# Patient Record
Sex: MALE | Race: WHITE | Employment: OTHER | URBAN - METROPOLITAN AREA
[De-identification: names, ages, dates, MRNs, and addresses within clinical notes are randomized per-mention and may not be internally consistent; named-entity substitution may affect disease eponyms.]

---

## 2017-08-03 ENCOUNTER — HOSPITAL ENCOUNTER (OUTPATIENT)
Dept: GASTROENTEROLOGY | Age: 62
Discharge: HOME | End: 2017-09-01
Attending: INTERNAL MEDICINE | Admitting: INTERNAL MEDICINE

## 2017-10-23 PROBLEM — G93.40 ACUTE ENCEPHALOPATHY: Status: ACTIVE | Noted: 2017-10-23

## 2017-10-23 PROBLEM — E87.1 HYPONATREMIA: Status: ACTIVE | Noted: 2017-10-23

## 2017-10-23 PROBLEM — I10 HYPERTENSION: Status: ACTIVE | Noted: 2017-10-23

## 2018-02-12 ENCOUNTER — HOSPITAL ENCOUNTER (OUTPATIENT)
Dept: GENERAL RADIOLOGY | Age: 63
Discharge: HOME | End: 2018-02-12
Attending: FAMILY MEDICINE | Admitting: FAMILY MEDICINE

## 2018-02-12 DIAGNOSIS — R52 PAIN: ICD-10-CM

## 2018-02-21 ENCOUNTER — HOSPITAL ENCOUNTER (OUTPATIENT)
Dept: NEUROLOGY | Age: 63
Discharge: HOME | End: 2018-02-21
Attending: PAIN MEDICINE | Admitting: FAMILY MEDICINE

## 2018-02-21 DIAGNOSIS — G62.9 POLYNEUROPATHY: ICD-10-CM

## 2018-02-22 NOTE — PROCEDURES
[address], [address] [place] [address]                               ELECTROMYOGRAM REPORT    PATIENT NAME: Rhianna Ortez                        :        1955  MED REC NO:   [id number]                          ROOM:  ACCOUNT NO:   [de-identified]                          ADMIT DATE: 2018  PROVIDER:     Sveta Hodgson MD    DATE OF EM2018    REFERRING PROVIDER:  Dr. Cornelia Arias:  1.  Pain in both hands and arms. 2.  Pain in the cervical spine. IMPRESSION:  1. Mild neurogenic EMG changes are seen in the lower cervical paraspinal  muscles in C5-C6 region. This may be suggestive of mild C5-C6  radiculopathy. 2.  Nerve conduction velocities are generally on the lower side of the  normal limit; underlying peripheral neuropathy may need to be considered. 3.  No definite EMG changes are seen in the right and left arm muscles at  this time.         Violet Lee MD    D: 2018 17:25:37       T: 2018 17:28:07     [id number]  Job#: [id number]     Doc#: [id number]    CC:

## 2018-09-27 ENCOUNTER — OFFICE VISIT (OUTPATIENT)
Dept: NEUROSURGERY | Age: 63
End: 2018-09-27
Payer: MEDICARE

## 2018-09-27 VITALS
DIASTOLIC BLOOD PRESSURE: 74 MMHG | SYSTOLIC BLOOD PRESSURE: 118 MMHG | HEIGHT: 66 IN | WEIGHT: 190.8 LBS | HEART RATE: 75 BPM | BODY MASS INDEX: 30.67 KG/M2

## 2018-09-27 DIAGNOSIS — M47.12 CERVICAL SPONDYLOSIS WITH MYELOPATHY: ICD-10-CM

## 2018-09-27 DIAGNOSIS — M47.812 CERVICAL SPONDYLOSIS WITHOUT MYELOPATHY: Primary | ICD-10-CM

## 2018-09-27 PROCEDURE — 99201 PR OFFICE OUTPATIENT NEW 10 MINUTES: CPT | Performed by: NEUROLOGICAL SURGERY

## 2018-10-25 ENCOUNTER — HOSPITAL ENCOUNTER (OUTPATIENT)
Dept: GENERAL RADIOLOGY | Age: 63
Discharge: HOME | End: 2018-10-25
Payer: MEDICARE

## 2018-10-25 DIAGNOSIS — K92.2 ACUTE GASTROINTESTINAL HEMORRHAGE: ICD-10-CM

## 2018-10-25 PROCEDURE — 74245 FL UGI W SMALL BOWEL: CPT

## 2018-10-25 PROCEDURE — 74240 X-RAY XM UPR GI TRC 1CNTRST: CPT

## 2019-03-14 ENCOUNTER — OFFICE VISIT (OUTPATIENT)
Dept: NEUROSURGERY | Age: 64
End: 2019-03-14
Payer: MEDICARE

## 2019-03-14 VITALS
HEART RATE: 99 BPM | WEIGHT: 205.8 LBS | SYSTOLIC BLOOD PRESSURE: 134 MMHG | HEIGHT: 66 IN | DIASTOLIC BLOOD PRESSURE: 76 MMHG | BODY MASS INDEX: 33.07 KG/M2

## 2019-03-14 DIAGNOSIS — M54.16 LUMBAR RADICULOPATHY: ICD-10-CM

## 2019-03-14 DIAGNOSIS — M54.12 CERVICAL RADICULOPATHY: Primary | ICD-10-CM

## 2019-03-14 PROCEDURE — 99213 OFFICE O/P EST LOW 20 MIN: CPT | Performed by: PHYSICIAN ASSISTANT

## 2019-03-14 RX ORDER — BISACODYL 10 MG
10 SUPPOSITORY, RECTAL RECTAL DAILY
COMMUNITY
End: 2020-03-20

## 2019-03-14 RX ORDER — METHADONE HYDROCHLORIDE 5 MG/1
5 TABLET ORAL EVERY 12 HOURS PRN
Status: ON HOLD | COMMUNITY
End: 2021-01-01 | Stop reason: HOSPADM

## 2019-03-14 ASSESSMENT — ENCOUNTER SYMPTOMS
CHEST TIGHTNESS: 0
SHORTNESS OF BREATH: 0
ABDOMINAL PAIN: 0
BACK PAIN: 1
ABDOMINAL DISTENTION: 0
APNEA: 0

## 2019-06-24 ENCOUNTER — HOSPITAL ENCOUNTER (OUTPATIENT)
Age: 64
Setting detail: SPECIMEN
Discharge: HOME | End: 2019-06-24
Payer: MEDICARE

## 2019-06-24 LAB
ALBUMIN SERPL-MCNC: 4.4 GM/DL (ref 3.4–5)
ALP BLD-CCNC: 71 IU/L (ref 40–129)
ALT SERPL-CCNC: 57 U/L (ref 10–40)
ANION GAP SERPL CALCULATED.3IONS-SCNC: 13 MMOL/L (ref 4–16)
AST SERPL-CCNC: 43 IU/L (ref 15–37)
BANDED NEUTROPHILS ABSOLUTE COUNT: 0.03 K/CU MM
BANDED NEUTROPHILS RELATIVE PERCENT: 1 % (ref 5–11)
BILIRUB SERPL-MCNC: 0.3 MG/DL (ref 0–1)
BUN BLDV-MCNC: 15 MG/DL (ref 6–23)
CALCIUM SERPL-MCNC: 9.3 MG/DL (ref 8.3–10.6)
CHLORIDE BLD-SCNC: 93 MMOL/L (ref 99–110)
CO2: 26 MMOL/L (ref 21–32)
CREAT SERPL-MCNC: 0.6 MG/DL (ref 0.9–1.3)
DIFFERENTIAL TYPE: ABNORMAL
ERYTHROCYTE SEDIMENTATION RATE: 18 MM/HR (ref 0–20)
FERRITIN: 27 NG/ML (ref 30–400)
FOLATE: 7.3 NG/ML (ref 3.1–17.5)
GFR AFRICAN AMERICAN: >60 ML/MIN/1.73M2
GFR NON-AFRICAN AMERICAN: >60 ML/MIN/1.73M2
GLUCOSE BLD-MCNC: 228 MG/DL (ref 70–99)
HCT VFR BLD CALC: 39.8 % (ref 42–52)
HEMOGLOBIN: 12.7 GM/DL (ref 13.5–18)
IRON: 58 UG/DL (ref 59–158)
LACTATE DEHYDROGENASE: 188 IU/L (ref 120–246)
LYMPHOCYTES ABSOLUTE: 0.7 K/CU MM
LYMPHOCYTES RELATIVE PERCENT: 22 % (ref 24–44)
MCH RBC QN AUTO: 27.3 PG (ref 27–31)
MCHC RBC AUTO-ENTMCNC: 31.9 % (ref 32–36)
MCV RBC AUTO: 85.6 FL (ref 78–100)
MONOCYTES ABSOLUTE: 0.1 K/CU MM
MONOCYTES RELATIVE PERCENT: 3 % (ref 0–4)
PCT TRANSFERRIN: 11 % (ref 10–44)
PDW BLD-RTO: 14.2 % (ref 11.7–14.9)
PLATELET # BLD: 60 K/CU MM (ref 140–440)
PLT MORPHOLOGY: ABNORMAL
PMV BLD AUTO: 10.7 FL (ref 7.5–11.1)
POTASSIUM SERPL-SCNC: 4.5 MMOL/L (ref 3.5–5.1)
RBC # BLD: 4.65 M/CU MM (ref 4.6–6.2)
RBC # BLD: ABNORMAL 10*6/UL
SEGMENTED NEUTROPHILS ABSOLUTE COUNT: 2.4 K/CU MM
SEGMENTED NEUTROPHILS RELATIVE PERCENT: 74 % (ref 36–66)
SODIUM BLD-SCNC: 132 MMOL/L (ref 135–145)
TOTAL IRON BINDING CAPACITY: 522 UG/DL (ref 250–450)
TOTAL PROTEIN: 7.8 GM/DL (ref 6.4–8.2)
TSH HIGH SENSITIVITY: 1.47 UIU/ML (ref 0.27–4.2)
UNSATURATED IRON BINDING CAPACITY: 464 UG/DL (ref 110–370)
VITAMIN B-12: 985 PG/ML (ref 211–911)
WBC # BLD: 3.2 K/CU MM (ref 4–10.5)

## 2019-06-24 PROCEDURE — 83550 IRON BINDING TEST: CPT

## 2019-06-24 PROCEDURE — 85007 BL SMEAR W/DIFF WBC COUNT: CPT

## 2019-06-24 PROCEDURE — 85652 RBC SED RATE AUTOMATED: CPT

## 2019-06-24 PROCEDURE — 82607 VITAMIN B-12: CPT

## 2019-06-24 PROCEDURE — 83615 LACTATE (LD) (LDH) ENZYME: CPT

## 2019-06-24 PROCEDURE — 83540 ASSAY OF IRON: CPT

## 2019-06-24 PROCEDURE — 86038 ANTINUCLEAR ANTIBODIES: CPT

## 2019-06-24 PROCEDURE — 82728 ASSAY OF FERRITIN: CPT

## 2019-06-24 PROCEDURE — 82746 ASSAY OF FOLIC ACID SERUM: CPT

## 2019-06-24 PROCEDURE — 80053 COMPREHEN METABOLIC PANEL: CPT

## 2019-06-24 PROCEDURE — 88185 FLOWCYTOMETRY/TC ADD-ON: CPT

## 2019-06-24 PROCEDURE — 86430 RHEUMATOID FACTOR TEST QUAL: CPT

## 2019-06-24 PROCEDURE — 88184 FLOWCYTOMETRY/ TC 1 MARKER: CPT

## 2019-06-24 PROCEDURE — 84443 ASSAY THYROID STIM HORMONE: CPT

## 2019-06-24 PROCEDURE — 88182 CELL MARKER STUDY: CPT

## 2019-06-24 PROCEDURE — 85027 COMPLETE CBC AUTOMATED: CPT

## 2019-06-26 LAB
RHEUMATOID FACTOR: 19 IU/ML (ref 0–14)
RHEUMATOID FACTOR: ABNORMAL IU/ML (ref 0–14)

## 2019-06-27 LAB
ANTI-NUCLEAR ANTIBODY (ANA): NORMAL
ANTI-NUCLEAR ANTIBODY (ANA): NORMAL
COMMENT: NORMAL

## 2019-07-12 ENCOUNTER — HOSPITAL ENCOUNTER (OUTPATIENT)
Age: 64
Setting detail: SPECIMEN
Discharge: HOME | End: 2019-07-12
Payer: MEDICARE

## 2019-07-12 LAB
HAV IGM SER IA-ACNC: NON REACTIVE
HEPATITIS B CORE IGM ANTIBODY: NON REACTIVE
HEPATITIS C ANTIBODY: ABNORMAL

## 2019-07-12 PROCEDURE — 86705 HEP B CORE ANTIBODY IGM: CPT

## 2019-07-12 PROCEDURE — 86709 HEPATITIS A IGM ANTIBODY: CPT

## 2019-07-12 PROCEDURE — 86803 HEPATITIS C AB TEST: CPT

## 2019-09-13 ENCOUNTER — HOSPITAL ENCOUNTER (OUTPATIENT)
Age: 64
Setting detail: SPECIMEN
Discharge: HOME | End: 2019-09-13
Payer: MEDICARE

## 2019-09-13 LAB
ALBUMIN SERPL-MCNC: 4.3 GM/DL (ref 3.4–5)
ALP BLD-CCNC: 68 IU/L (ref 40–129)
ALT SERPL-CCNC: 50 U/L (ref 10–40)
ANION GAP SERPL CALCULATED.3IONS-SCNC: 13 MMOL/L (ref 4–16)
AST SERPL-CCNC: 41 IU/L (ref 15–37)
BILIRUB SERPL-MCNC: 0.3 MG/DL (ref 0–1)
BUN BLDV-MCNC: 9 MG/DL (ref 6–23)
CALCIUM SERPL-MCNC: 9 MG/DL (ref 8.3–10.6)
CHLORIDE BLD-SCNC: 94 MMOL/L (ref 99–110)
CO2: 26 MMOL/L (ref 21–32)
CREAT SERPL-MCNC: 0.5 MG/DL (ref 0.9–1.3)
FERRITIN: 50 NG/ML (ref 30–400)
GFR AFRICAN AMERICAN: >60 ML/MIN/1.73M2
GFR NON-AFRICAN AMERICAN: >60 ML/MIN/1.73M2
GLUCOSE BLD-MCNC: 193 MG/DL (ref 70–99)
IRON: 52 UG/DL (ref 59–158)
PCT TRANSFERRIN: 13 % (ref 10–44)
POTASSIUM SERPL-SCNC: 3.9 MMOL/L (ref 3.5–5.1)
SODIUM BLD-SCNC: 133 MMOL/L (ref 135–145)
TOTAL IRON BINDING CAPACITY: 410 UG/DL (ref 250–450)
TOTAL PROTEIN: 7.3 GM/DL (ref 6.4–8.2)
UNSATURATED IRON BINDING CAPACITY: 358 UG/DL (ref 110–370)

## 2019-09-13 PROCEDURE — 82728 ASSAY OF FERRITIN: CPT

## 2019-09-13 PROCEDURE — 80053 COMPREHEN METABOLIC PANEL: CPT

## 2019-09-13 PROCEDURE — 83550 IRON BINDING TEST: CPT

## 2019-09-13 PROCEDURE — 82105 ALPHA-FETOPROTEIN SERUM: CPT

## 2019-09-13 PROCEDURE — 83540 ASSAY OF IRON: CPT

## 2019-09-17 LAB
MS ALPHA-FETOPROTEIN: 4 NG/ML (ref 0–9)
MS ALPHA-FETOPROTEIN: NORMAL NG/ML (ref 0–9)

## 2019-09-24 ENCOUNTER — HOSPITAL ENCOUNTER (OUTPATIENT)
Dept: ULTRASOUND IMAGING | Age: 64
Discharge: HOME | End: 2019-09-24
Payer: MEDICARE

## 2019-09-24 DIAGNOSIS — B18.2 CHRONIC HEPATITIS C WITHOUT HEPATIC COMA (HCC): ICD-10-CM

## 2019-09-24 PROCEDURE — 76705 ECHO EXAM OF ABDOMEN: CPT

## 2019-11-18 ENCOUNTER — HOSPITAL ENCOUNTER (OUTPATIENT)
Dept: INTERVENTIONAL RADIOLOGY/VASCULAR | Age: 64
Discharge: HOME | End: 2019-11-18
Payer: MEDICARE

## 2019-11-18 VITALS
BODY MASS INDEX: 34.53 KG/M2 | OXYGEN SATURATION: 97 % | WEIGHT: 220 LBS | HEART RATE: 94 BPM | HEIGHT: 67 IN | DIASTOLIC BLOOD PRESSURE: 60 MMHG | TEMPERATURE: 97.1 F | SYSTOLIC BLOOD PRESSURE: 123 MMHG | RESPIRATION RATE: 16 BRPM

## 2019-11-18 DIAGNOSIS — B18.2 HEP C W/O COMA, CHRONIC (HCC): ICD-10-CM

## 2019-11-18 LAB
APTT: 27.1 SECONDS (ref 25.1–37.1)
HCT VFR BLD CALC: 41.3 % (ref 42–52)
HEMOGLOBIN: 13.6 GM/DL (ref 13.5–18)
INR BLD: 1.13 INDEX
MCH RBC QN AUTO: 28.8 PG (ref 27–31)
MCHC RBC AUTO-ENTMCNC: 32.9 % (ref 32–36)
MCV RBC AUTO: 87.5 FL (ref 78–100)
PDW BLD-RTO: 14 % (ref 11.7–14.9)
PLATELET # BLD: ABNORMAL K/CU MM (ref 140–440)
PMV BLD AUTO: 10 FL (ref 7.5–11.1)
PROTHROMBIN TIME: 13.1 SECONDS (ref 11.7–14.5)
RBC # BLD: 4.72 M/CU MM (ref 4.6–6.2)
WBC # BLD: 3.9 K/CU MM (ref 4–10.5)

## 2019-11-18 PROCEDURE — 85730 THROMBOPLASTIN TIME PARTIAL: CPT

## 2019-11-18 PROCEDURE — 88313 SPECIAL STAINS GROUP 2: CPT

## 2019-11-18 PROCEDURE — 2580000003 HC RX 258: Performed by: RADIOLOGY

## 2019-11-18 PROCEDURE — 2709999900 HC NON-CHARGEABLE SUPPLY

## 2019-11-18 PROCEDURE — 76942 ECHO GUIDE FOR BIOPSY: CPT

## 2019-11-18 PROCEDURE — 85610 PROTHROMBIN TIME: CPT

## 2019-11-18 PROCEDURE — 7100000010 HC PHASE II RECOVERY - FIRST 15 MIN

## 2019-11-18 PROCEDURE — 85027 COMPLETE CBC AUTOMATED: CPT

## 2019-11-18 PROCEDURE — 88307 TISSUE EXAM BY PATHOLOGIST: CPT

## 2019-11-18 PROCEDURE — 47000 NEEDLE BIOPSY OF LIVER PERQ: CPT

## 2019-11-18 PROCEDURE — 7100000011 HC PHASE II RECOVERY - ADDTL 15 MIN

## 2019-11-18 RX ORDER — MIDAZOLAM HYDROCHLORIDE 1 MG/ML
1 INJECTION INTRAMUSCULAR; INTRAVENOUS ONCE
Status: DISCONTINUED | OUTPATIENT
Start: 2019-11-18 | End: 2019-11-19 | Stop reason: HOSPADM

## 2019-11-18 RX ORDER — FENTANYL CITRATE 50 UG/ML
50 INJECTION, SOLUTION INTRAMUSCULAR; INTRAVENOUS ONCE
Status: DISCONTINUED | OUTPATIENT
Start: 2019-11-18 | End: 2019-11-19 | Stop reason: HOSPADM

## 2019-11-18 RX ORDER — SODIUM CHLORIDE 0.9 % (FLUSH) 0.9 %
10 SYRINGE (ML) INJECTION 2 TIMES DAILY
Status: DISCONTINUED | OUTPATIENT
Start: 2019-11-18 | End: 2019-11-19 | Stop reason: HOSPADM

## 2019-11-18 RX ADMIN — Medication 10 ML: at 08:49

## 2019-11-18 ASSESSMENT — PAIN SCALES - GENERAL
PAINLEVEL_OUTOF10: 0

## 2019-11-18 ASSESSMENT — PAIN - FUNCTIONAL ASSESSMENT: PAIN_FUNCTIONAL_ASSESSMENT: 0-10

## 2020-01-01 ENCOUNTER — HOSPITAL ENCOUNTER (OUTPATIENT)
Dept: PHYSICAL THERAPY | Age: 65
Setting detail: THERAPIES SERIES
Discharge: HOME | End: 2020-11-18
Payer: MEDICARE

## 2020-01-01 ENCOUNTER — HOSPITAL ENCOUNTER (OUTPATIENT)
Dept: GENERAL RADIOLOGY | Age: 65
Discharge: HOME | End: 2020-10-28
Payer: MEDICARE

## 2020-01-01 ENCOUNTER — TELEPHONE (OUTPATIENT)
Dept: FAMILY MEDICINE CLINIC | Age: 65
End: 2020-01-01

## 2020-01-01 ENCOUNTER — HOSPITAL ENCOUNTER (OUTPATIENT)
Dept: PHYSICAL THERAPY | Age: 65
Discharge: HOME | End: 2020-12-03

## 2020-01-01 ENCOUNTER — APPOINTMENT (OUTPATIENT)
Dept: CT IMAGING | Age: 65
End: 2020-01-01
Payer: MEDICARE

## 2020-01-01 ENCOUNTER — OFFICE VISIT (OUTPATIENT)
Dept: ONCOLOGY | Age: 65
End: 2020-01-01
Payer: MEDICARE

## 2020-01-01 ENCOUNTER — OFFICE VISIT (OUTPATIENT)
Dept: FAMILY MEDICINE CLINIC | Age: 65
End: 2020-01-01
Payer: MEDICARE

## 2020-01-01 ENCOUNTER — HOSPITAL ENCOUNTER (OUTPATIENT)
Dept: INFUSION THERAPY | Age: 65
Discharge: HOME | End: 2020-09-10
Payer: MEDICARE

## 2020-01-01 ENCOUNTER — HOSPITAL ENCOUNTER (EMERGENCY)
Age: 65
Discharge: HOME | End: 2020-11-26
Payer: MEDICARE

## 2020-01-01 ENCOUNTER — HOSPITAL ENCOUNTER (OUTPATIENT)
Dept: PHYSICAL THERAPY | Age: 65
Setting detail: THERAPIES SERIES
Discharge: HOME | End: 2020-11-03
Payer: MEDICARE

## 2020-01-01 ENCOUNTER — HOSPITAL ENCOUNTER (EMERGENCY)
Age: 65
Discharge: HOME | End: 2020-11-27
Attending: EMERGENCY MEDICINE
Payer: MEDICARE

## 2020-01-01 ENCOUNTER — HOSPITAL ENCOUNTER (EMERGENCY)
Age: 65
Discharge: ANOTHER HOSPITAL | End: 2020-11-28
Attending: EMERGENCY MEDICINE
Payer: MEDICARE

## 2020-01-01 ENCOUNTER — HOSPITAL ENCOUNTER (OUTPATIENT)
Age: 65
Discharge: HOME | End: 2020-10-28
Payer: MEDICARE

## 2020-01-01 ENCOUNTER — TELEPHONE (OUTPATIENT)
Dept: ONCOLOGY | Age: 65
End: 2020-01-01

## 2020-01-01 ENCOUNTER — HOSPITAL ENCOUNTER (OUTPATIENT)
Age: 65
Discharge: HOME | End: 2020-10-14
Payer: MEDICARE

## 2020-01-01 ENCOUNTER — HOSPITAL ENCOUNTER (OUTPATIENT)
Dept: ULTRASOUND IMAGING | Age: 65
Discharge: HOME | End: 2020-09-08
Payer: MEDICARE

## 2020-01-01 ENCOUNTER — HOSPITAL ENCOUNTER (OUTPATIENT)
Dept: PHYSICAL THERAPY | Age: 65
Discharge: HOME | End: 2020-12-01

## 2020-01-01 ENCOUNTER — HOSPITAL ENCOUNTER (OUTPATIENT)
Dept: INFUSION THERAPY | Age: 65
Discharge: HOME | End: 2020-09-03
Payer: MEDICARE

## 2020-01-01 VITALS
OXYGEN SATURATION: 96 % | WEIGHT: 212 LBS | HEIGHT: 66 IN | HEART RATE: 110 BPM | TEMPERATURE: 97.9 F | SYSTOLIC BLOOD PRESSURE: 176 MMHG | BODY MASS INDEX: 34.07 KG/M2 | DIASTOLIC BLOOD PRESSURE: 94 MMHG | RESPIRATION RATE: 19 BRPM

## 2020-01-01 VITALS
OXYGEN SATURATION: 96 % | HEIGHT: 67 IN | RESPIRATION RATE: 18 BRPM | SYSTOLIC BLOOD PRESSURE: 122 MMHG | DIASTOLIC BLOOD PRESSURE: 78 MMHG | TEMPERATURE: 96.6 F | WEIGHT: 212.2 LBS | HEART RATE: 92 BPM | BODY MASS INDEX: 33.3 KG/M2

## 2020-01-01 VITALS
TEMPERATURE: 97.2 F | BODY MASS INDEX: 33.27 KG/M2 | SYSTOLIC BLOOD PRESSURE: 116 MMHG | WEIGHT: 212 LBS | DIASTOLIC BLOOD PRESSURE: 72 MMHG | HEIGHT: 67 IN | OXYGEN SATURATION: 95 % | HEART RATE: 103 BPM

## 2020-01-01 VITALS
RESPIRATION RATE: 18 BRPM | OXYGEN SATURATION: 100 % | SYSTOLIC BLOOD PRESSURE: 128 MMHG | BODY MASS INDEX: 32.96 KG/M2 | TEMPERATURE: 98.2 F | HEART RATE: 98 BPM | DIASTOLIC BLOOD PRESSURE: 78 MMHG | HEIGHT: 67 IN | WEIGHT: 210 LBS

## 2020-01-01 VITALS
SYSTOLIC BLOOD PRESSURE: 126 MMHG | WEIGHT: 211.8 LBS | BODY MASS INDEX: 33.24 KG/M2 | HEART RATE: 86 BPM | TEMPERATURE: 97.2 F | OXYGEN SATURATION: 97 % | DIASTOLIC BLOOD PRESSURE: 78 MMHG | HEIGHT: 67 IN

## 2020-01-01 VITALS
RESPIRATION RATE: 20 BRPM | BODY MASS INDEX: 34.07 KG/M2 | HEIGHT: 66 IN | WEIGHT: 212 LBS | OXYGEN SATURATION: 98 % | DIASTOLIC BLOOD PRESSURE: 82 MMHG | HEART RATE: 88 BPM | SYSTOLIC BLOOD PRESSURE: 142 MMHG | TEMPERATURE: 98.6 F

## 2020-01-01 DIAGNOSIS — D64.89 OTHER SPECIFIED ANEMIAS: ICD-10-CM

## 2020-01-01 DIAGNOSIS — F19.10 POLYSUBSTANCE ABUSE (HCC): Primary | ICD-10-CM

## 2020-01-01 DIAGNOSIS — D72.819 LEUKOPENIA, UNSPECIFIED TYPE: ICD-10-CM

## 2020-01-01 DIAGNOSIS — K74.60 HEPATIC CIRRHOSIS DUE TO CHRONIC HEPATITIS C INFECTION (HCC): ICD-10-CM

## 2020-01-01 DIAGNOSIS — B18.2 HEPATIC CIRRHOSIS DUE TO CHRONIC HEPATITIS C INFECTION (HCC): ICD-10-CM

## 2020-01-01 DIAGNOSIS — D69.59 OTHER SECONDARY THROMBOCYTOPENIA: ICD-10-CM

## 2020-01-01 LAB
ACETAMINOPHEN LEVEL: <5 UG/ML (ref 15–30)
ACETAMINOPHEN LEVEL: <5 UG/ML (ref 15–30)
ALBUMIN SERPL-MCNC: 4.1 GM/DL (ref 3.4–5)
ALBUMIN SERPL-MCNC: 4.2 GM/DL (ref 3.4–5)
ALBUMIN SERPL-MCNC: 4.5 GM/DL (ref 3.4–5)
ALCOHOL SCREEN SERUM: <0.01 %WT/VOL
ALCOHOL SCREEN SERUM: <0.01 %WT/VOL
ALP BLD-CCNC: 69 IU/L (ref 40–129)
ALP BLD-CCNC: 76 IU/L (ref 40–129)
ALP BLD-CCNC: 77 IU/L (ref 40–129)
ALT SERPL-CCNC: 26 U/L (ref 10–40)
ALT SERPL-CCNC: 30 U/L (ref 10–40)
ALT SERPL-CCNC: 48 U/L (ref 10–40)
ALT SERPL-CCNC: 73 U/L (ref 10–40)
AMPHETAMINES: NEGATIVE
ANION GAP SERPL CALCULATED.3IONS-SCNC: 14 MMOL/L (ref 4–16)
ANION GAP SERPL CALCULATED.3IONS-SCNC: 14 MMOL/L (ref 4–16)
ANION GAP SERPL CALCULATED.3IONS-SCNC: 17 MMOL/L (ref 4–16)
ANTI DNA DOUBLE STRANDED: NORMAL
AST SERPL-CCNC: 109 IU/L (ref 15–37)
AST SERPL-CCNC: 26 IU/L (ref 15–37)
AST SERPL-CCNC: 26 IU/L (ref 15–37)
AST SERPL-CCNC: 76 IU/L (ref 15–37)
BACTERIA: NEGATIVE /HPF
BARBITURATE SCREEN URINE: NEGATIVE
BASOPHILS ABSOLUTE: 0 K/CU MM
BASOPHILS ABSOLUTE: 0 K/CU MM
BASOPHILS ABSOLUTE: 0.1 K/CU MM
BASOPHILS RELATIVE PERCENT: 0.2 % (ref 0–1)
BASOPHILS RELATIVE PERCENT: 0.4 % (ref 0–1)
BASOPHILS RELATIVE PERCENT: 0.5 % (ref 0–1)
BENZODIAZEPINE SCREEN, URINE: NEGATIVE
BILIRUB SERPL-MCNC: 0.8 MG/DL (ref 0–1)
BILIRUB SERPL-MCNC: 0.9 MG/DL (ref 0–1)
BILIRUB SERPL-MCNC: 1.2 MG/DL (ref 0–1)
BILIRUBIN URINE: NEGATIVE MG/DL
BILIRUBIN, POC: ABNORMAL
BLOOD URINE, POC: ABNORMAL
BLOOD, URINE: NEGATIVE
BUN BLDV-MCNC: 10 MG/DL (ref 6–23)
BUN BLDV-MCNC: 11 MG/DL (ref 6–23)
BUN BLDV-MCNC: 13 MG/DL (ref 6–23)
BUN BLDV-MCNC: 20 MG/DL (ref 6–23)
CALCIUM SERPL-MCNC: 8.6 MG/DL (ref 8.3–10.6)
CALCIUM SERPL-MCNC: 8.9 MG/DL (ref 8.3–10.6)
CALCIUM SERPL-MCNC: 9 MG/DL (ref 8.3–10.6)
CANNABINOID SCREEN URINE: ABNORMAL
CHLORIDE BLD-SCNC: 94 MMOL/L (ref 99–110)
CHLORIDE BLD-SCNC: 95 MMOL/L (ref 99–110)
CHLORIDE BLD-SCNC: 96 MMOL/L (ref 99–110)
CLARITY, POC: ABNORMAL
CLARITY: CLEAR
CO2: 22 MMOL/L (ref 21–32)
CO2: 22 MMOL/L (ref 21–32)
CO2: 23 MMOL/L (ref 21–32)
COCAINE METABOLITE: NEGATIVE
COLOR, POC: ABNORMAL
COLOR: YELLOW
COMPLEMENT C3: 110 MG/DL (ref 88–201)
COMPLEMENT C4: 10 MG/DL (ref 10–40)
CREAT SERPL-MCNC: 0.6 MG/DL (ref 0.9–1.3)
CREAT SERPL-MCNC: 0.7 MG/DL (ref 0.9–1.3)
DIFFERENTIAL TYPE: ABNORMAL
DOSE AMOUNT: ABNORMAL
DOSE TIME: ABNORMAL
EOSINOPHILS ABSOLUTE: 0 K/CU MM
EOSINOPHILS ABSOLUTE: 0 K/CU MM
EOSINOPHILS ABSOLUTE: 0.1 K/CU MM
EOSINOPHILS RELATIVE PERCENT: 0 % (ref 0–3)
EOSINOPHILS RELATIVE PERCENT: 0.6 % (ref 0–3)
EOSINOPHILS RELATIVE PERCENT: 0.8 % (ref 0–3)
ERYTHROCYTE SEDIMENTATION RATE: 1 MM/HR (ref 0–20)
ESTIMATED AVERAGE GLUCOSE: 177.2 MG/DL
FERRITIN: 134 NG/ML (ref 30–400)
GFR AFRICAN AMERICAN: >60 ML/MIN/1.73M2
GFR NON-AFRICAN AMERICAN: >60 ML/MIN/1.73M2
GLUCOSE BLD-MCNC: 289 MG/DL (ref 70–99)
GLUCOSE BLD-MCNC: 293 MG/DL (ref 70–99)
GLUCOSE BLD-MCNC: 307 MG/DL (ref 70–99)
GLUCOSE BLD-MCNC: 339 MG/DL (ref 70–99)
GLUCOSE BLD-MCNC: 348 MG/DL (ref 70–99)
GLUCOSE URINE, POC: ABNORMAL
GLUCOSE, URINE: >500 MG/DL
HBA1C MFR BLD: 7.3 %
HBA1C MFR BLD: 7.8 %
HCT VFR BLD CALC: 44.3 % (ref 42–52)
HCT VFR BLD CALC: 46.3 % (ref 42–52)
HCT VFR BLD CALC: 46.8 % (ref 42–52)
HEMOGLOBIN: 14.8 GM/DL (ref 13.5–18)
HEMOGLOBIN: 15.9 GM/DL (ref 13.5–18)
HEMOGLOBIN: 15.9 GM/DL (ref 13.5–18)
HEPATITIS C ANTIBODY INTERPRETATION: REACTIVE
HYALINE CASTS: 0 /LPF
IMMATURE NEUTROPHIL %: 0.4 % (ref 0–0.43)
IMMATURE NEUTROPHIL %: 0.8 % (ref 0–0.43)
IRON: 100 UG/DL (ref 59–158)
KETONES, POC: ABNORMAL
KETONES, URINE: ABNORMAL MG/DL
LEUKOCYTE EST, POC: ABNORMAL
LEUKOCYTE ESTERASE, URINE: NEGATIVE
LYMPHOCYTES ABSOLUTE: 0.8 K/CU MM
LYMPHOCYTES ABSOLUTE: 1.1 K/CU MM
LYMPHOCYTES ABSOLUTE: 1.8 K/CU MM
LYMPHOCYTES RELATIVE PERCENT: 15.5 % (ref 24–44)
LYMPHOCYTES RELATIVE PERCENT: 18.2 % (ref 24–44)
LYMPHOCYTES RELATIVE PERCENT: 7.9 % (ref 24–44)
MCH RBC QN AUTO: 28.8 PG (ref 27–31)
MCH RBC QN AUTO: 29.5 PG (ref 27–31)
MCH RBC QN AUTO: 29.9 PG (ref 27–31)
MCHC RBC AUTO-ENTMCNC: 33.4 % (ref 32–36)
MCHC RBC AUTO-ENTMCNC: 34 % (ref 32–36)
MCHC RBC AUTO-ENTMCNC: 34.3 % (ref 32–36)
MCV RBC AUTO: 85.9 FL (ref 78–100)
MCV RBC AUTO: 86.4 FL (ref 78–100)
MCV RBC AUTO: 88.1 FL (ref 78–100)
MONOCYTES ABSOLUTE: 0.3 K/CU MM
MONOCYTES ABSOLUTE: 0.8 K/CU MM
MONOCYTES ABSOLUTE: 0.8 K/CU MM
MONOCYTES RELATIVE PERCENT: 5.6 % (ref 0–4)
MONOCYTES RELATIVE PERCENT: 6.2 % (ref 0–4)
MONOCYTES RELATIVE PERCENT: 8 % (ref 0–4)
MS ALPHA-FETOPROTEIN: 4 NG/ML (ref 0–9)
MUCUS: ABNORMAL HPF
NITRITE URINE, QUANTITATIVE: NEGATIVE
NITRITE, POC: ABNORMAL
NUCLEATED RBC %: 0 %
NUCLEATED RBC %: 0 %
OPIATES, URINE: NEGATIVE
OXYCODONE: NEGATIVE
PCT TRANSFERRIN: 25 % (ref 10–44)
PDW BLD-RTO: 13.5 % (ref 11.7–14.9)
PDW BLD-RTO: 13.6 % (ref 11.7–14.9)
PDW BLD-RTO: 14.9 % (ref 11.7–14.9)
PH, POC: 5
PH, URINE: 6 (ref 5–8)
PHENCYCLIDINE, URINE: NEGATIVE
PLATELET # BLD: 106 K/CU MM (ref 140–440)
PLATELET # BLD: 127 K/CU MM (ref 140–440)
PLATELET # BLD: 45 K/CU MM (ref 140–440)
PMV BLD AUTO: 10.7 FL (ref 7.5–11.1)
PMV BLD AUTO: 8.9 FL (ref 7.5–11.1)
PMV BLD AUTO: 9.4 FL (ref 7.5–11.1)
POTASSIUM SERPL-SCNC: 3.9 MMOL/L (ref 3.5–5.1)
POTASSIUM SERPL-SCNC: 4.2 MMOL/L (ref 3.5–5.1)
POTASSIUM SERPL-SCNC: 4.3 MMOL/L (ref 3.5–5.1)
PROSTATE SPECIFIC ANTIGEN: 0.22 NG/ML (ref 0–4)
PROTEIN UA: 100 MG/DL
PROTEIN, POC: ABNORMAL
RBC # BLD: 5.13 M/CU MM (ref 4.6–6.2)
RBC # BLD: 5.31 M/CU MM (ref 4.6–6.2)
RBC # BLD: 5.39 M/CU MM (ref 4.6–6.2)
RBC URINE: ABNORMAL /HPF (ref 0–3)
RHEUMATOID FACTOR: 15 IU/ML (ref 0–14)
SALICYLATE LEVEL: 0.2 MG/DL (ref 15–30)
SALICYLATE LEVEL: <0.3 MG/DL (ref 15–30)
SARS-COV-2, NAAT: NOT DETECTED
SEGMENTED NEUTROPHILS ABSOLUTE COUNT: 11.4 K/CU MM
SEGMENTED NEUTROPHILS ABSOLUTE COUNT: 4.1 K/CU MM
SEGMENTED NEUTROPHILS ABSOLUTE COUNT: 7.1 K/CU MM
SEGMENTED NEUTROPHILS RELATIVE PERCENT: 72.3 % (ref 36–66)
SEGMENTED NEUTROPHILS RELATIVE PERCENT: 77.1 % (ref 36–66)
SEGMENTED NEUTROPHILS RELATIVE PERCENT: 85.5 % (ref 36–66)
SODIUM BLD-SCNC: 132 MMOL/L (ref 135–145)
SODIUM BLD-SCNC: 132 MMOL/L (ref 135–145)
SODIUM BLD-SCNC: 133 MMOL/L (ref 135–145)
SOURCE: NORMAL
SPECIFIC GRAVITY UA: 1.03 (ref 1–1.03)
SPECIFIC GRAVITY, POC: 1.02
TOTAL IMMATURE NEUTOROPHIL: 0.04 K/CU MM
TOTAL IMMATURE NEUTOROPHIL: 0.11 K/CU MM
TOTAL IRON BINDING CAPACITY: 404 UG/DL (ref 250–450)
TOTAL NUCLEATED RBC: 0 K/CU MM
TOTAL NUCLEATED RBC: 0 K/CU MM
TOTAL PROTEIN: 7.5 GM/DL (ref 6.4–8.2)
TOTAL PROTEIN: 7.5 GM/DL (ref 6.4–8.2)
TOTAL PROTEIN: 7.8 GM/DL (ref 6.4–8.2)
TRICHOMONAS: ABNORMAL /HPF
UNSATURATED IRON BINDING CAPACITY: 304 UG/DL (ref 110–370)
UROBILINOGEN, POC: ABNORMAL
UROBILINOGEN, URINE: NORMAL MG/DL (ref 0.2–1)
WBC # BLD: 13.3 K/CU MM (ref 4–10.5)
WBC # BLD: 5.3 K/CU MM (ref 4–10.5)
WBC # BLD: 9.8 K/CU MM (ref 4–10.5)
WBC UA: 1 /HPF (ref 0–2)

## 2020-01-01 PROCEDURE — G0480 DRUG TEST DEF 1-7 CLASSES: HCPCS

## 2020-01-01 PROCEDURE — 96372 THER/PROPH/DIAG INJ SC/IM: CPT

## 2020-01-01 PROCEDURE — 3051F HG A1C>EQUAL 7.0%<8.0%: CPT | Performed by: NURSE PRACTITIONER

## 2020-01-01 PROCEDURE — 85652 RBC SED RATE AUTOMATED: CPT

## 2020-01-01 PROCEDURE — 83540 ASSAY OF IRON: CPT

## 2020-01-01 PROCEDURE — 85025 COMPLETE CBC W/AUTO DIFF WBC: CPT

## 2020-01-01 PROCEDURE — 82728 ASSAY OF FERRITIN: CPT

## 2020-01-01 PROCEDURE — 82962 GLUCOSE BLOOD TEST: CPT

## 2020-01-01 PROCEDURE — 6360000002 HC RX W HCPCS: Performed by: EMERGENCY MEDICINE

## 2020-01-01 PROCEDURE — 97535 SELF CARE MNGMENT TRAINING: CPT

## 2020-01-01 PROCEDURE — U0002 COVID-19 LAB TEST NON-CDC: HCPCS

## 2020-01-01 PROCEDURE — 99285 EMERGENCY DEPT VISIT HI MDM: CPT

## 2020-01-01 PROCEDURE — 82565 ASSAY OF CREATININE: CPT

## 2020-01-01 PROCEDURE — 36415 COLL VENOUS BLD VENIPUNCTURE: CPT

## 2020-01-01 PROCEDURE — G8427 DOCREV CUR MEDS BY ELIG CLIN: HCPCS | Performed by: INTERNAL MEDICINE

## 2020-01-01 PROCEDURE — 1123F ACP DISCUSS/DSCN MKR DOCD: CPT | Performed by: NURSE PRACTITIONER

## 2020-01-01 PROCEDURE — 83550 IRON BINDING TEST: CPT

## 2020-01-01 PROCEDURE — 81002 URINALYSIS NONAUTO W/O SCOPE: CPT | Performed by: NURSE PRACTITIONER

## 2020-01-01 PROCEDURE — 72050 X-RAY EXAM NECK SPINE 4/5VWS: CPT

## 2020-01-01 PROCEDURE — 80307 DRUG TEST PRSMV CHEM ANLYZR: CPT

## 2020-01-01 PROCEDURE — 3023F SPIROM DOC REV: CPT | Performed by: NURSE PRACTITIONER

## 2020-01-01 PROCEDURE — 86430 RHEUMATOID FACTOR TEST QUAL: CPT

## 2020-01-01 PROCEDURE — 4040F PNEUMOC VAC/ADMIN/RCVD: CPT | Performed by: NURSE PRACTITIONER

## 2020-01-01 PROCEDURE — G8484 FLU IMMUNIZE NO ADMIN: HCPCS | Performed by: NURSE PRACTITIONER

## 2020-01-01 PROCEDURE — 99214 OFFICE O/P EST MOD 30 MIN: CPT | Performed by: NURSE PRACTITIONER

## 2020-01-01 PROCEDURE — 3017F COLORECTAL CA SCREEN DOC REV: CPT | Performed by: NURSE PRACTITIONER

## 2020-01-01 PROCEDURE — 2022F DILAT RTA XM EVC RTNOPTHY: CPT | Performed by: NURSE PRACTITIONER

## 2020-01-01 PROCEDURE — 72100 X-RAY EXAM L-S SPINE 2/3 VWS: CPT

## 2020-01-01 PROCEDURE — 80053 COMPREHEN METABOLIC PANEL: CPT

## 2020-01-01 PROCEDURE — 99213 OFFICE O/P EST LOW 20 MIN: CPT | Performed by: INTERNAL MEDICINE

## 2020-01-01 PROCEDURE — 82105 ALPHA-FETOPROTEIN SERUM: CPT

## 2020-01-01 PROCEDURE — 99211 OFF/OP EST MAY X REQ PHY/QHP: CPT

## 2020-01-01 PROCEDURE — 84520 ASSAY OF UREA NITROGEN: CPT

## 2020-01-01 PROCEDURE — 4004F PT TOBACCO SCREEN RCVD TLK: CPT | Performed by: INTERNAL MEDICINE

## 2020-01-01 PROCEDURE — G8427 DOCREV CUR MEDS BY ELIG CLIN: HCPCS | Performed by: NURSE PRACTITIONER

## 2020-01-01 PROCEDURE — 99213 OFFICE O/P EST LOW 20 MIN: CPT | Performed by: NURSE PRACTITIONER

## 2020-01-01 PROCEDURE — 72072 X-RAY EXAM THORAC SPINE 3VWS: CPT

## 2020-01-01 PROCEDURE — 3017F COLORECTAL CA SCREEN DOC REV: CPT | Performed by: INTERNAL MEDICINE

## 2020-01-01 PROCEDURE — 97112 NEUROMUSCULAR REEDUCATION: CPT

## 2020-01-01 PROCEDURE — G8417 CALC BMI ABV UP PARAM F/U: HCPCS | Performed by: NURSE PRACTITIONER

## 2020-01-01 PROCEDURE — 6370000000 HC RX 637 (ALT 250 FOR IP): Performed by: EMERGENCY MEDICINE

## 2020-01-01 PROCEDURE — G8926 SPIRO NO PERF OR DOC: HCPCS | Performed by: NURSE PRACTITIONER

## 2020-01-01 PROCEDURE — 86160 COMPLEMENT ANTIGEN: CPT

## 2020-01-01 PROCEDURE — 97161 PT EVAL LOW COMPLEX 20 MIN: CPT

## 2020-01-01 PROCEDURE — 76705 ECHO EXAM OF ABDOMEN: CPT

## 2020-01-01 PROCEDURE — 81001 URINALYSIS AUTO W/SCOPE: CPT

## 2020-01-01 PROCEDURE — 70450 CT HEAD/BRAIN W/O DYE: CPT

## 2020-01-01 PROCEDURE — 1123F ACP DISCUSS/DSCN MKR DOCD: CPT | Performed by: INTERNAL MEDICINE

## 2020-01-01 PROCEDURE — G8417 CALC BMI ABV UP PARAM F/U: HCPCS | Performed by: INTERNAL MEDICINE

## 2020-01-01 PROCEDURE — 4040F PNEUMOC VAC/ADMIN/RCVD: CPT | Performed by: INTERNAL MEDICINE

## 2020-01-01 PROCEDURE — 4004F PT TOBACCO SCREEN RCVD TLK: CPT | Performed by: NURSE PRACTITIONER

## 2020-01-01 PROCEDURE — 99282 EMERGENCY DEPT VISIT SF MDM: CPT

## 2020-01-01 PROCEDURE — 84450 TRANSFERASE (AST) (SGOT): CPT

## 2020-01-01 PROCEDURE — 83036 HEMOGLOBIN GLYCOSYLATED A1C: CPT | Performed by: NURSE PRACTITIONER

## 2020-01-01 PROCEDURE — 36415 COLL VENOUS BLD VENIPUNCTURE: CPT | Performed by: NURSE PRACTITIONER

## 2020-01-01 PROCEDURE — 96374 THER/PROPH/DIAG INJ IV PUSH: CPT

## 2020-01-01 PROCEDURE — 2580000003 HC RX 258: Performed by: EMERGENCY MEDICINE

## 2020-01-01 PROCEDURE — 84460 ALANINE AMINO (ALT) (SGPT): CPT

## 2020-01-01 PROCEDURE — 86225 DNA ANTIBODY NATIVE: CPT

## 2020-01-01 PROCEDURE — 6370000000 HC RX 637 (ALT 250 FOR IP): Performed by: PHYSICIAN ASSISTANT

## 2020-01-01 RX ORDER — PRAZOSIN HYDROCHLORIDE 5 MG/1
10 CAPSULE ORAL NIGHTLY
Qty: 180 CAPSULE | Refills: 0 | Status: SHIPPED | OUTPATIENT
Start: 2020-01-01 | End: 2020-01-01 | Stop reason: SDUPTHER

## 2020-01-01 RX ORDER — PRAZOSIN HYDROCHLORIDE 5 MG/1
10 CAPSULE ORAL NIGHTLY
Qty: 180 CAPSULE | Refills: 0 | Status: SHIPPED | OUTPATIENT
Start: 2020-01-01 | End: 2021-01-01

## 2020-01-01 RX ORDER — CALCITRIOL 0.25 UG/1
0.25 CAPSULE, LIQUID FILLED ORAL DAILY
Status: DISCONTINUED | OUTPATIENT
Start: 2020-01-01 | End: 2020-01-01 | Stop reason: HOSPADM

## 2020-01-01 RX ORDER — FERROUS SULFATE 325(65) MG
325 TABLET ORAL 2 TIMES DAILY WITH MEALS
Status: DISCONTINUED | OUTPATIENT
Start: 2020-01-01 | End: 2020-01-01 | Stop reason: HOSPADM

## 2020-01-01 RX ORDER — M-VIT,TX,IRON,MINS/CALC/FOLIC 27MG-0.4MG
1 TABLET ORAL DAILY
Status: DISCONTINUED | OUTPATIENT
Start: 2020-01-01 | End: 2020-01-01 | Stop reason: HOSPADM

## 2020-01-01 RX ORDER — IBUPROFEN 200 MG
1 TABLET ORAL DAILY
Qty: 200 EACH | Refills: 5 | Status: SHIPPED | OUTPATIENT
Start: 2020-01-01

## 2020-01-01 RX ORDER — IBUPROFEN 600 MG/1
600 TABLET ORAL ONCE
Status: COMPLETED | OUTPATIENT
Start: 2020-01-01 | End: 2020-01-01

## 2020-01-01 RX ORDER — NICOTINE 21 MG/24HR
1 PATCH, TRANSDERMAL 24 HOURS TRANSDERMAL DAILY
Status: DISCONTINUED | OUTPATIENT
Start: 2020-01-01 | End: 2020-01-01

## 2020-01-01 RX ORDER — CALCITRIOL 0.25 UG/1
0.25 CAPSULE, LIQUID FILLED ORAL DAILY
Qty: 90 CAPSULE | Refills: 0 | Status: ON HOLD | OUTPATIENT
Start: 2020-01-01 | End: 2021-01-01 | Stop reason: HOSPADM

## 2020-01-01 RX ORDER — PREGABALIN 25 MG/1
100 CAPSULE ORAL 2 TIMES DAILY
Status: DISCONTINUED | OUTPATIENT
Start: 2020-01-01 | End: 2020-01-01 | Stop reason: HOSPADM

## 2020-01-01 RX ORDER — KETOROLAC TROMETHAMINE 30 MG/ML
15 INJECTION, SOLUTION INTRAMUSCULAR; INTRAVENOUS ONCE
Status: COMPLETED | OUTPATIENT
Start: 2020-01-01 | End: 2020-01-01

## 2020-01-01 RX ORDER — NICOTINE 21 MG/24HR
1 PATCH, TRANSDERMAL 24 HOURS TRANSDERMAL ONCE
Status: DISCONTINUED | OUTPATIENT
Start: 2020-01-01 | End: 2020-01-01 | Stop reason: HOSPADM

## 2020-01-01 RX ORDER — OXYCODONE HYDROCHLORIDE 10 MG/1
TABLET ORAL
Status: ON HOLD | COMMUNITY
Start: 2020-01-01 | End: 2021-01-01 | Stop reason: SDUPTHER

## 2020-01-01 RX ORDER — PHENOL 1.4 %
10 AEROSOL, SPRAY (ML) MUCOUS MEMBRANE NIGHTLY
Qty: 90 TABLET | Refills: 0 | Status: SHIPPED | OUTPATIENT
Start: 2020-01-01 | End: 2020-01-01

## 2020-01-01 RX ORDER — PNV NO.95/FERROUS FUM/FOLIC AC 28MG-0.8MG
TABLET ORAL
Qty: 90 TABLET | Refills: 0 | Status: SHIPPED | OUTPATIENT
Start: 2020-01-01 | End: 2020-01-01 | Stop reason: SDUPTHER

## 2020-01-01 RX ORDER — LISINOPRIL 10 MG/1
5 TABLET ORAL ONCE
Status: DISCONTINUED | OUTPATIENT
Start: 2020-01-01 | End: 2020-01-01 | Stop reason: HOSPADM

## 2020-01-01 RX ORDER — 0.9 % SODIUM CHLORIDE 0.9 %
1000 INTRAVENOUS SOLUTION INTRAVENOUS ONCE
Status: COMPLETED | OUTPATIENT
Start: 2020-01-01 | End: 2020-01-01

## 2020-01-01 RX ORDER — ATORVASTATIN CALCIUM 10 MG/1
10 TABLET, FILM COATED ORAL DAILY
Qty: 90 TABLET | Refills: 0 | Status: SHIPPED | OUTPATIENT
Start: 2020-01-01 | End: 2020-01-01

## 2020-01-01 RX ORDER — ARIPIPRAZOLE 20 MG/1
20 TABLET ORAL DAILY
Qty: 90 TABLET | Refills: 0 | Status: SHIPPED | OUTPATIENT
Start: 2020-01-01 | End: 2020-01-01

## 2020-01-01 RX ORDER — IBUPROFEN 800 MG/1
800 TABLET ORAL ONCE
Status: COMPLETED | OUTPATIENT
Start: 2020-01-01 | End: 2020-01-01

## 2020-01-01 RX ORDER — INSULIN GLARGINE 100 [IU]/ML
85 INJECTION, SOLUTION SUBCUTANEOUS 2 TIMES DAILY
Status: DISCONTINUED | OUTPATIENT
Start: 2020-01-01 | End: 2020-01-01 | Stop reason: HOSPADM

## 2020-01-01 RX ORDER — ATORVASTATIN CALCIUM 10 MG/1
10 TABLET, FILM COATED ORAL NIGHTLY
Status: DISCONTINUED | OUTPATIENT
Start: 2020-01-01 | End: 2020-01-01 | Stop reason: HOSPADM

## 2020-01-01 RX ORDER — PNV NO.95/FERROUS FUM/FOLIC AC 28MG-0.8MG
TABLET ORAL
Qty: 90 TABLET | Refills: 0 | Status: SHIPPED | OUTPATIENT
Start: 2020-01-01 | End: 2021-01-01 | Stop reason: SDUPTHER

## 2020-01-01 RX ORDER — HYDROXYZINE PAMOATE 25 MG/1
25 CAPSULE ORAL ONCE
Status: COMPLETED | OUTPATIENT
Start: 2020-01-01 | End: 2020-01-01

## 2020-01-01 RX ORDER — HYDROXYCHLOROQUINE SULFATE 200 MG/1
TABLET, FILM COATED ORAL
COMMUNITY
Start: 2020-01-01

## 2020-01-01 RX ORDER — TRAZODONE HYDROCHLORIDE 100 MG/1
100 TABLET ORAL NIGHTLY
Qty: 45 TABLET | Refills: 3 | OUTPATIENT
Start: 2020-01-01 | End: 2020-01-01

## 2020-01-01 RX ORDER — ARIPIPRAZOLE 10 MG/1
20 TABLET ORAL DAILY
Status: DISCONTINUED | OUTPATIENT
Start: 2020-01-01 | End: 2020-01-01 | Stop reason: HOSPADM

## 2020-01-01 RX ORDER — LISINOPRIL 5 MG/1
5 TABLET ORAL DAILY
Qty: 90 TABLET | Refills: 0 | Status: SHIPPED | OUTPATIENT
Start: 2020-01-01 | End: 2021-01-01 | Stop reason: DRUGHIGH

## 2020-01-01 RX ORDER — PHENOL 1.4 %
1 AEROSOL, SPRAY (ML) MUCOUS MEMBRANE NIGHTLY
Qty: 90 TABLET | Refills: 0 | Status: SHIPPED | OUTPATIENT
Start: 2020-01-01 | End: 2020-01-01

## 2020-01-01 RX ORDER — BACLOFEN 10 MG/1
TABLET ORAL
COMMUNITY
Start: 2020-01-01 | End: 2020-01-01

## 2020-01-01 RX ORDER — NALOXONE HYDROCHLORIDE 4 MG/.1ML
SPRAY NASAL
Status: ON HOLD | COMMUNITY
Start: 2020-01-01 | End: 2021-01-01 | Stop reason: SDUPTHER

## 2020-01-01 RX ADMIN — IBUPROFEN 800 MG: 800 TABLET, FILM COATED ORAL at 01:14

## 2020-01-01 RX ADMIN — ARIPIPRAZOLE 20 MG: 10 TABLET ORAL at 13:54

## 2020-01-01 RX ADMIN — HYDROXYZINE PAMOATE 25 MG: 25 CAPSULE ORAL at 20:47

## 2020-01-01 RX ADMIN — INSULIN GLARGINE 85 UNITS: 100 INJECTION, SOLUTION SUBCUTANEOUS at 13:56

## 2020-01-01 RX ADMIN — CALCITRIOL CAPSULES 0.25 MCG 0.25 MCG: 0.25 CAPSULE ORAL at 13:54

## 2020-01-01 RX ADMIN — KETOROLAC TROMETHAMINE 15 MG: 30 INJECTION, SOLUTION INTRAMUSCULAR; INTRAVENOUS at 23:34

## 2020-01-01 RX ADMIN — FERROUS SULFATE TAB 325 MG (65 MG ELEMENTAL FE) 325 MG: 325 (65 FE) TAB at 13:54

## 2020-01-01 RX ADMIN — SODIUM CHLORIDE 1000 ML: 9 INJECTION, SOLUTION INTRAVENOUS at 23:34

## 2020-01-01 RX ADMIN — IBUPROFEN 600 MG: 600 TABLET, FILM COATED ORAL at 20:47

## 2020-01-01 RX ADMIN — INSULIN HUMAN 5 UNITS: 100 INJECTION, SOLUTION PARENTERAL at 11:50

## 2020-01-01 ASSESSMENT — PAIN SCALES - GENERAL
PAINLEVEL_OUTOF10: 8
PAINLEVEL_OUTOF10: 4
PAINLEVEL_OUTOF10: 9
PAINLEVEL_OUTOF10: 6
PAINLEVEL_OUTOF10: 9

## 2020-01-01 ASSESSMENT — ENCOUNTER SYMPTOMS
DIARRHEA: 0
SHORTNESS OF BREATH: 0
BACK PAIN: 1
CONSTIPATION: 0
NAUSEA: 0
COUGH: 0
BLOOD IN STOOL: 0
COUGH: 0
NAUSEA: 0
VOMITING: 0
ABDOMINAL PAIN: 0
BACK PAIN: 1
CONSTIPATION: 0
BLOOD IN STOOL: 0
ABDOMINAL PAIN: 0
SHORTNESS OF BREATH: 0
VOMITING: 0

## 2020-01-01 ASSESSMENT — PAIN DESCRIPTION - LOCATION
LOCATION: GENERALIZED
LOCATION: NECK

## 2020-01-01 ASSESSMENT — PAIN DESCRIPTION - FREQUENCY: FREQUENCY: CONTINUOUS

## 2020-01-01 ASSESSMENT — PAIN DESCRIPTION - PAIN TYPE: TYPE: ACUTE PAIN

## 2020-01-01 ASSESSMENT — PAIN DESCRIPTION - ORIENTATION: ORIENTATION: MID

## 2020-01-01 ASSESSMENT — PAIN DESCRIPTION - DESCRIPTORS: DESCRIPTORS: ACHING;SPASM

## 2020-01-14 ENCOUNTER — HOSPITAL ENCOUNTER (OUTPATIENT)
Dept: INFUSION THERAPY | Age: 65
Discharge: HOME | End: 2020-01-14
Payer: MEDICARE

## 2020-01-14 LAB
ALBUMIN SERPL-MCNC: 4.3 GM/DL (ref 3.4–5)
ALP BLD-CCNC: 57 IU/L (ref 40–129)
ALT SERPL-CCNC: 57 U/L (ref 10–40)
ANION GAP SERPL CALCULATED.3IONS-SCNC: 14 MMOL/L (ref 4–16)
AST SERPL-CCNC: 49 IU/L (ref 15–37)
BANDED NEUTROPHILS ABSOLUTE COUNT: 0.22 K/CU MM
BANDED NEUTROPHILS RELATIVE PERCENT: 7 % (ref 5–11)
BILIRUB SERPL-MCNC: 0.4 MG/DL (ref 0–1)
BUN BLDV-MCNC: 11 MG/DL (ref 6–23)
CALCIUM SERPL-MCNC: 8.8 MG/DL (ref 8.3–10.6)
CHLORIDE BLD-SCNC: 92 MMOL/L (ref 99–110)
CO2: 25 MMOL/L (ref 21–32)
CREAT SERPL-MCNC: 0.6 MG/DL (ref 0.9–1.3)
DIFFERENTIAL TYPE: ABNORMAL
DIFFERENTIAL TYPE: ABNORMAL
FERRITIN: 91 NG/ML (ref 30–400)
GFR AFRICAN AMERICAN: >60 ML/MIN/1.73M2
GFR NON-AFRICAN AMERICAN: >60 ML/MIN/1.73M2
GLUCOSE BLD-MCNC: 247 MG/DL (ref 70–99)
HCT VFR BLD CALC: 41.4 % (ref 42–52)
HEMOGLOBIN: 14 GM/DL (ref 13.5–18)
IRON: 83 UG/DL (ref 59–158)
LYMPHOCYTES ABSOLUTE: 0.4 K/CU MM
LYMPHOCYTES RELATIVE PERCENT: 13 % (ref 24–44)
MCH RBC QN AUTO: 29.4 PG (ref 27–31)
MCHC RBC AUTO-ENTMCNC: 33.8 % (ref 32–36)
MCV RBC AUTO: 86.8 FL (ref 78–100)
MONOCYTES ABSOLUTE: 0.3 K/CU MM
MONOCYTES RELATIVE PERCENT: 9 % (ref 0–4)
PCT TRANSFERRIN: 20 % (ref 10–44)
PDW BLD-RTO: 14.7 % (ref 11.7–14.9)
PLATELET # BLD: 39 K/CU MM (ref 140–440)
PMV BLD AUTO: 10.3 FL (ref 7.5–11.1)
POTASSIUM SERPL-SCNC: 3.9 MMOL/L (ref 3.5–5.1)
RBC # BLD: 4.77 M/CU MM (ref 4.6–6.2)
RBC # BLD: ABNORMAL 10*6/UL
SEGMENTED NEUTROPHILS ABSOLUTE COUNT: 2.2 K/CU MM
SEGMENTED NEUTROPHILS RELATIVE PERCENT: 71 % (ref 36–66)
SODIUM BLD-SCNC: 131 MMOL/L (ref 135–145)
TOTAL IRON BINDING CAPACITY: 410 UG/DL (ref 250–450)
TOTAL PROTEIN: 7.6 GM/DL (ref 6.4–8.2)
UNSATURATED IRON BINDING CAPACITY: 327 UG/DL (ref 110–370)
WBC # BLD: 3.1 K/CU MM (ref 4–10.5)

## 2020-01-14 PROCEDURE — 83550 IRON BINDING TEST: CPT

## 2020-01-14 PROCEDURE — 85027 COMPLETE CBC AUTOMATED: CPT

## 2020-01-14 PROCEDURE — 85007 BL SMEAR W/DIFF WBC COUNT: CPT

## 2020-01-14 PROCEDURE — 82728 ASSAY OF FERRITIN: CPT

## 2020-01-14 PROCEDURE — 83540 ASSAY OF IRON: CPT

## 2020-01-14 PROCEDURE — 36415 COLL VENOUS BLD VENIPUNCTURE: CPT

## 2020-01-14 PROCEDURE — 80053 COMPREHEN METABOLIC PANEL: CPT

## 2020-03-19 ENCOUNTER — TELEPHONE (OUTPATIENT)
Dept: FAMILY MEDICINE CLINIC | Age: 65
End: 2020-03-19

## 2020-03-20 ENCOUNTER — OFFICE VISIT (OUTPATIENT)
Dept: FAMILY MEDICINE CLINIC | Age: 65
End: 2020-03-20
Payer: MEDICARE

## 2020-03-20 VITALS
TEMPERATURE: 98 F | DIASTOLIC BLOOD PRESSURE: 60 MMHG | OXYGEN SATURATION: 98 % | HEART RATE: 111 BPM | SYSTOLIC BLOOD PRESSURE: 110 MMHG | WEIGHT: 218 LBS | HEIGHT: 67 IN | BODY MASS INDEX: 34.21 KG/M2

## 2020-03-20 PROBLEM — R21 RASH AND NONSPECIFIC SKIN ERUPTION: Status: ACTIVE | Noted: 2020-03-20

## 2020-03-20 PROBLEM — Z79.4 TYPE 2 DIABETES MELLITUS, WITH LONG-TERM CURRENT USE OF INSULIN (HCC): Status: ACTIVE | Noted: 2020-03-20

## 2020-03-20 PROBLEM — E11.9 TYPE 2 DIABETES MELLITUS, WITH LONG-TERM CURRENT USE OF INSULIN (HCC): Status: ACTIVE | Noted: 2020-03-20

## 2020-03-20 PROBLEM — G89.29 CHRONIC BACK PAIN: Status: ACTIVE | Noted: 2020-03-20

## 2020-03-20 PROBLEM — M54.9 CHRONIC BACK PAIN: Status: ACTIVE | Noted: 2020-03-20

## 2020-03-20 PROCEDURE — 3023F SPIROM DOC REV: CPT | Performed by: NURSE PRACTITIONER

## 2020-03-20 PROCEDURE — G8427 DOCREV CUR MEDS BY ELIG CLIN: HCPCS | Performed by: NURSE PRACTITIONER

## 2020-03-20 PROCEDURE — G8926 SPIRO NO PERF OR DOC: HCPCS | Performed by: NURSE PRACTITIONER

## 2020-03-20 PROCEDURE — G8417 CALC BMI ABV UP PARAM F/U: HCPCS | Performed by: NURSE PRACTITIONER

## 2020-03-20 PROCEDURE — 99205 OFFICE O/P NEW HI 60 MIN: CPT | Performed by: NURSE PRACTITIONER

## 2020-03-20 PROCEDURE — G8484 FLU IMMUNIZE NO ADMIN: HCPCS | Performed by: NURSE PRACTITIONER

## 2020-03-20 PROCEDURE — 3046F HEMOGLOBIN A1C LEVEL >9.0%: CPT | Performed by: NURSE PRACTITIONER

## 2020-03-20 PROCEDURE — 4004F PT TOBACCO SCREEN RCVD TLK: CPT | Performed by: NURSE PRACTITIONER

## 2020-03-20 PROCEDURE — 3017F COLORECTAL CA SCREEN DOC REV: CPT | Performed by: NURSE PRACTITIONER

## 2020-03-20 PROCEDURE — 2022F DILAT RTA XM EVC RTNOPTHY: CPT | Performed by: NURSE PRACTITIONER

## 2020-03-20 RX ORDER — DIAPER,BRIEF,INFANT-TODD,DISP
EACH MISCELLANEOUS
Qty: 1 TUBE | Refills: 1 | Status: CANCELLED | OUTPATIENT
Start: 2020-03-20 | End: 2020-03-27

## 2020-03-20 RX ORDER — ALBUTEROL SULFATE 90 UG/1
2 AEROSOL, METERED RESPIRATORY (INHALATION) EVERY 6 HOURS PRN
COMMUNITY

## 2020-03-20 RX ORDER — LISINOPRIL 5 MG/1
5 TABLET ORAL DAILY
COMMUNITY
Start: 2020-03-18 | End: 2020-04-15 | Stop reason: SDUPTHER

## 2020-03-20 RX ORDER — FERROUS SULFATE 325(65) MG
325 TABLET ORAL DAILY
COMMUNITY
Start: 2015-12-17 | End: 2020-04-15 | Stop reason: SDUPTHER

## 2020-03-20 RX ORDER — IBUPROFEN 600 MG/1
600 TABLET ORAL EVERY 6 HOURS PRN
COMMUNITY
End: 2020-04-15 | Stop reason: SDUPTHER

## 2020-03-20 RX ORDER — ATORVASTATIN CALCIUM 10 MG/1
10 TABLET, FILM COATED ORAL DAILY
COMMUNITY
Start: 2020-03-18 | End: 2020-04-15 | Stop reason: SDUPTHER

## 2020-03-20 RX ORDER — VELPATASVIR AND SOFOSBUVIR 100; 400 MG/1; MG/1
400 TABLET, FILM COATED ORAL DAILY
COMMUNITY
Start: 2020-03-18 | End: 2020-05-20 | Stop reason: ALTCHOICE

## 2020-03-20 RX ORDER — ETODOLAC 500 MG/1
500 TABLET, FILM COATED ORAL 2 TIMES DAILY
COMMUNITY
End: 2020-04-15 | Stop reason: SDUPTHER

## 2020-03-20 RX ORDER — DIAPER,BRIEF,INFANT-TODD,DISP
EACH MISCELLANEOUS
Qty: 1 TUBE | Refills: 1 | Status: SHIPPED | OUTPATIENT
Start: 2020-03-20 | End: 2020-03-27

## 2020-03-20 RX ORDER — PHENOL 1.4 %
AEROSOL, SPRAY (ML) MUCOUS MEMBRANE NIGHTLY
COMMUNITY
End: 2020-04-15 | Stop reason: SDUPTHER

## 2020-03-20 RX ORDER — HYDROXYCHLOROQUINE SULFATE 200 MG/1
TABLET, FILM COATED ORAL 2 TIMES DAILY
COMMUNITY
End: 2020-04-15 | Stop reason: SDUPTHER

## 2020-03-20 ASSESSMENT — ENCOUNTER SYMPTOMS
ABDOMINAL PAIN: 0
SHORTNESS OF BREATH: 0
CONSTIPATION: 0
NAUSEA: 0
VOMITING: 0
DIARRHEA: 0
BACK PAIN: 1
COUGH: 0

## 2020-03-20 NOTE — PROGRESS NOTES
arthritis: taking plaquenil. Chronic intermittent hand rash: likely contact dermatitis, uses hydrocortisone cream and the rash goes away. Is red, not itchy, does not open skin. Hepatitis C with cirrhosis: He is unsure why or how he has this diagnosis. Is follow with Dr. Laura Vargas. Taking Epclusa. Follows with Dr. Louis Israel (unsure why), Dr. Laura aVrgas (hepatits C with cirrhosis), Dr. Char Knowles (pain management). Review of Systems   Constitutional: Negative for activity change, chills, diaphoresis, fatigue, fever and unexpected weight change. Respiratory: Negative for cough and shortness of breath. Cardiovascular: Negative for chest pain and palpitations. Gastrointestinal: Negative for abdominal pain, constipation, diarrhea, nausea and vomiting. Endocrine: Negative for cold intolerance, heat intolerance, polydipsia, polyphagia and polyuria. Genitourinary: Negative for difficulty urinating. Musculoskeletal: Positive for arthralgias, back pain and gait problem. Negative for myalgias. Skin: Negative for rash and wound. Neurological: Negative for weakness and headaches. Psychiatric/Behavioral: Negative for agitation, confusion, dysphoric mood, sleep disturbance and suicidal ideas. The patient is not nervous/anxious. Prior to Visit Medications    Medication Sig Taking?  Authorizing Provider   ibuprofen (ADVIL;MOTRIN) 600 MG tablet Take 600 mg by mouth every 6 hours as needed for Pain Yes Historical Provider, MD   EPCLUSA 400-100 MG TABS Take 400 mg by mouth daily Yes Historical Provider, MD   ferrous sulfate (IRON 325) 325 (65 Fe) MG tablet Take 325 mg by mouth daily Yes Historical Provider, MD   atorvastatin (LIPITOR) 10 MG tablet Take 10 mg by mouth daily Yes Historical Provider, MD   lisinopril (PRINIVIL;ZESTRIL) 5 MG tablet Take 5 mg by mouth daily Yes Historical Provider, MD   Sennosides (SENOKOT PO) Take by mouth nightly Yes Historical Provider, MD   albuterol sulfate HFA (PROAIR HFA) 108 (90 Base) MCG/ACT inhaler Inhale 2 puffs into the lungs every 6 hours as needed for Wheezing Yes Historical Provider, MD   etodolac (LODINE) 500 MG tablet Take 500 mg by mouth 2 times daily Yes Historical Provider, MD   hydroxychloroquine (PLAQUENIL) 200 MG tablet Take by mouth 2 times daily Yes Historical Provider, MD   Melatonin 10 MG TABS Take by mouth nightly Yes Historical Provider, MD   Polyvinyl Alcohol-Povidone (REFRESH OP) Apply to eye Yes Historical Provider, MD   hydrocortisone (ALA-ЕКАТЕРИНА) 1 % cream Apply topically 2 times daily X 2 weeks Yes CAROLANN Sapp NP   insulin aspart (NOVOLOG) 100 UNIT/ML injection vial Inject 30 Units into the skin 3 times daily (before meals) Yes CAROLANN Sapp NP   methadone (DOLOPHINE) 5 MG tablet Take 5 mg by mouth every 12 hours as needed for Pain. Yes Historical Provider, MD   oxyCODONE (ROXICODONE) 5 MG immediate release tablet Take 10 mg by mouth every 6 hours as needed for Pain. . Yes Historical Provider, MD   baclofen (LIORESAL) 20 MG tablet Take 20 mg by mouth 3 times daily Yes Historical Provider, MD   ARIPiprazole (ABILIFY) 20 MG tablet Take 20 mg by mouth daily  Yes Historical Provider, MD   calcitRIOL (ROCALTROL) 0.25 MCG capsule Take 0.25 mcg by mouth daily Indications: supplement Yes Historical Provider, MD   Multiple Vitamin (DAILY WILLEM PO) Take 1 tablet by mouth daily Indications: supplement Yes Historical Provider, MD   insulin glargine (LANTUS) 100 UNIT/ML injection vial Inject 85 Units into the skin 2 times daily Yes Historical Provider, MD   traZODone (DESYREL) 50 MG tablet Take 100 mg by mouth nightly 2 tablets (200mg) at night Yes Historical Provider, MD   prazosin (MINIPRESS) 1 MG capsule Take 10 mg by mouth nightly Yes Historical Provider, MD   pregabalin (LYRICA) 100 MG capsule Take 200 mg by mouth 3 times daily.  Yes Historical Provider, MD        Allergies   Allergen Reactions    Latex     Acetaminophen      endstage liver disease    Bee Venom     Haldol [Haloperidol Lactate]     Nsaids      End stage liver disease    Nuts [Peanut-Containing Drug Products]     Other Swelling     insects       Past Medical History:   Diagnosis Date    Arthritis     [facility] 1 disorder ([facility]     Chronic airway obstruction, not elsewhere classified 10/28/2013    Chronic major depressive disorder, recurrent episode ([facility]     Diabetes mellitus ([facility]     GERD (gastroesophageal reflux disease)     Hepatitis C     Hypertension     Low back pain     Post traumatic stress disorder     Schizo affective schizophrenia ([facility]     per old chart pt in ER 7/28/2017- aggressive behavior at [facility]- and in ER- transferred to [address]) paranoid       History reviewed. No pertinent surgical history.     Social History     Socioeconomic History    Marital status: Unknown     Spouse name: Not on file    Number of children: Not on file    Years of education: Not on file    Highest education level: Not on file   Occupational History    Not on file   Social Needs    Financial resource strain: Not on file    Food insecurity     Worry: Not on file     Inability: Not on file    Transportation needs     Medical: Not on file     Non-medical: Not on file   Tobacco Use    Smoking status: Current Every Day Smoker     Packs/day: 0.50     Types: Cigarettes    Smokeless tobacco: Never Used   Substance and Sexual Activity    Alcohol use: No    Drug use: No    Sexual activity: Never   Lifestyle    Physical activity     Days per week: Not on file     Minutes per session: Not on file    Stress: Not on file   Relationships    Social connections     Talks on phone: Not on file     Gets together: Not on file     Attends [employer] service: Not on file     Active member of club or organization: Not on file     Attends meetings of clubs or organizations: Not on file     Relationship status: Not on file    Intimate partner violence     Fear Mental Status: He is alert and oriented to person, place, and time. Mental status is at baseline. Psychiatric:         Behavior: Behavior normal.         Thought Content: Thought content normal.         ASSESSMENT/PLAN:  1. Essential hypertension  Continue current medication regimen, no changes    2. Hepatic cirrhosis due to chronic hepatitis C infection ([facility]  Continue current medication regimen, no changes  Follow with GI/ [facility]     3. Simple chronic bronchitis (HCC)  Continue current medication regimen, no changes  Albuterol as needed. Follow up if increased need     4. Bipolar affective disorder, current episode mixed, current episode severity unspecified ([facility]  Continue current medication regimen, no changes  Refuses psychiatrist at this time    5. Severe single current episode of major depressive disorder, with psychotic features ([facility]  Continue current medication regimen, no changes    6. Chronic low back pain with sciatica, sciatica laterality unspecified, unspecified back pain laterality  Continue current medication regimen, no changes  Follow with Pain management     7. Type 2 diabetes mellitus with diabetic neuropathy, with long-term current use of insulin (HCC)  Continue current medication regimen, no changes  Accu check four times per day   Novolog refilled     8. Rash and nonspecific skin eruption  Refill hydrocortisone cream   Notify if rash worsens or does not resolve. Only use cream 2 weeks    He will bring all medications to next visit for medication check. Provider notes requested from all other providers seeing the patient in order to get a clear view of the patient needs and history. Return in about 4 weeks (around 4/17/2020) for bipolar, schizo, DM2, med management . An  electronic signature was used to authenticate this note.     --CAROLANN Carrasco NP on 3/20/2020 at 1:45 PM

## 2020-03-23 RX ORDER — IBUPROFEN 200 MG
1 TABLET ORAL DAILY
Qty: 100 EACH | Refills: 3 | Status: CANCELLED | OUTPATIENT
Start: 2020-03-23

## 2020-03-23 RX ORDER — IBUPROFEN 200 MG
1 TABLET ORAL DAILY
Qty: 100 EACH | Refills: 3 | Status: SHIPPED | OUTPATIENT
Start: 2020-03-23 | End: 2020-04-15

## 2020-03-26 ENCOUNTER — NURSE ONLY (OUTPATIENT)
Dept: FAMILY MEDICINE CLINIC | Age: 65
End: 2020-03-26
Payer: MEDICARE

## 2020-03-26 LAB
CHOLESTEROL, TOTAL: 95 MG/DL (ref 0–199)
CREATININE URINE: 123.8 MG/DL (ref 39–259)
HDLC SERPL-MCNC: 40 MG/DL (ref 40–60)
LDL CHOLESTEROL CALCULATED: 38 MG/DL
MICROALBUMIN UR-MCNC: 1.6 MG/DL
MICROALBUMIN/CREAT UR-RTO: 12.9 MG/G (ref 0–30)
TRIGL SERPL-MCNC: 87 MG/DL (ref 0–150)
VLDLC SERPL CALC-MCNC: 17 MG/DL

## 2020-03-26 PROCEDURE — 36415 COLL VENOUS BLD VENIPUNCTURE: CPT | Performed by: NURSE PRACTITIONER

## 2020-03-27 PROBLEM — R74.01 NONSPECIFIC ELEVATION OF LEVELS OF TRANSAMINASE AND LACTIC ACID DEHYDROGENASE (LDH): Status: ACTIVE | Noted: 2020-03-27

## 2020-03-27 PROBLEM — D72.819 DECREASED WHITE BLOOD CELL COUNT: Status: ACTIVE | Noted: 2020-03-27

## 2020-03-27 PROBLEM — R74.02 NONSPECIFIC ELEVATION OF LEVELS OF TRANSAMINASE AND LACTIC ACID DEHYDROGENASE (LDH): Status: ACTIVE | Noted: 2020-03-27

## 2020-03-27 PROBLEM — D69.59 OTHER SECONDARY THROMBOCYTOPENIA: Status: ACTIVE | Noted: 2020-03-27

## 2020-03-27 PROBLEM — D64.89 OTHER SPECIFIED ANEMIAS: Status: ACTIVE | Noted: 2020-03-27

## 2020-03-27 LAB
ESTIMATED AVERAGE GLUCOSE: 174.3 MG/DL
HBA1C MFR BLD: 7.7 %

## 2020-03-27 NOTE — RESULT ENCOUNTER NOTE
Please notify patient of their lab results :    Cholesterol is good. Urine is good. HGBA1C is 7.7  : recommendations are to be below 7, but you have liver disease and are taking multiple other drugs. I think this number is okay. Id like to keep it below 8. Now that youre in your new apartment; you can get outside and exercise. This might help; weight loss. Low carb diet, limit pop and sugar drinks.      -justin PICKARD

## 2020-04-13 RX ORDER — ATORVASTATIN CALCIUM 10 MG/1
10 TABLET, FILM COATED ORAL DAILY
Qty: 90 TABLET | Refills: 1 | OUTPATIENT
Start: 2020-04-13

## 2020-04-13 RX ORDER — ARIPIPRAZOLE 20 MG/1
20 TABLET ORAL DAILY
Qty: 90 TABLET | Refills: 1 | OUTPATIENT
Start: 2020-04-13

## 2020-04-13 RX ORDER — IBUPROFEN 600 MG/1
600 TABLET ORAL EVERY 6 HOURS PRN
Qty: 120 TABLET | OUTPATIENT
Start: 2020-04-13

## 2020-04-13 RX ORDER — INSULIN GLARGINE 100 [IU]/ML
85 INJECTION, SOLUTION SUBCUTANEOUS 2 TIMES DAILY
Qty: 1 VIAL | Refills: 3 | OUTPATIENT
Start: 2020-04-13

## 2020-04-13 RX ORDER — PHENOL 1.4 %
1 AEROSOL, SPRAY (ML) MUCOUS MEMBRANE NIGHTLY
Qty: 90 TABLET | Refills: 1 | OUTPATIENT
Start: 2020-04-13

## 2020-04-13 RX ORDER — FERROUS SULFATE 325(65) MG
325 TABLET ORAL DAILY
Qty: 90 TABLET | Refills: 1 | OUTPATIENT
Start: 2020-04-13

## 2020-04-13 RX ORDER — MULTIVITAMIN
1 TABLET ORAL DAILY
Qty: 90 TABLET | Refills: 1 | OUTPATIENT
Start: 2020-04-13

## 2020-04-13 RX ORDER — HYDROXYCHLOROQUINE SULFATE 200 MG/1
200 TABLET, FILM COATED ORAL 2 TIMES DAILY
Qty: 180 TABLET | Refills: 1 | OUTPATIENT
Start: 2020-04-13

## 2020-04-13 RX ORDER — LISINOPRIL 5 MG/1
5 TABLET ORAL DAILY
Qty: 90 TABLET | Refills: 1 | OUTPATIENT
Start: 2020-04-13

## 2020-04-13 RX ORDER — TRAZODONE HYDROCHLORIDE 100 MG/1
200 TABLET ORAL NIGHTLY
Qty: 180 TABLET | Refills: 1 | OUTPATIENT
Start: 2020-04-13

## 2020-04-13 RX ORDER — ETODOLAC 500 MG/1
500 TABLET, FILM COATED ORAL 2 TIMES DAILY
Qty: 90 TABLET | Refills: 1 | OUTPATIENT
Start: 2020-04-13

## 2020-04-13 RX ORDER — CALCITRIOL 0.25 UG/1
0.25 CAPSULE, LIQUID FILLED ORAL DAILY
Qty: 90 CAPSULE | Refills: 1 | OUTPATIENT
Start: 2020-04-13

## 2020-04-13 RX ORDER — PRAZOSIN HYDROCHLORIDE 5 MG/1
5 CAPSULE ORAL NIGHTLY
Qty: 180 CAPSULE | Refills: 1 | OUTPATIENT
Start: 2020-04-13

## 2020-04-14 PROBLEM — E87.1 HYPONATREMIA: Status: RESOLVED | Noted: 2017-10-23 | Resolved: 2020-04-14

## 2020-04-15 ENCOUNTER — VIRTUAL VISIT (OUTPATIENT)
Dept: FAMILY MEDICINE CLINIC | Age: 65
End: 2020-04-15
Payer: MEDICARE

## 2020-04-15 VITALS — WEIGHT: 218 LBS | BODY MASS INDEX: 34.66 KG/M2

## 2020-04-15 PROCEDURE — 2022F DILAT RTA XM EVC RTNOPTHY: CPT | Performed by: NURSE PRACTITIONER

## 2020-04-15 PROCEDURE — G8427 DOCREV CUR MEDS BY ELIG CLIN: HCPCS | Performed by: NURSE PRACTITIONER

## 2020-04-15 PROCEDURE — 99213 OFFICE O/P EST LOW 20 MIN: CPT | Performed by: NURSE PRACTITIONER

## 2020-04-15 PROCEDURE — 3051F HG A1C>EQUAL 7.0%<8.0%: CPT | Performed by: NURSE PRACTITIONER

## 2020-04-15 PROCEDURE — 3017F COLORECTAL CA SCREEN DOC REV: CPT | Performed by: NURSE PRACTITIONER

## 2020-04-15 RX ORDER — CALCITRIOL 0.25 UG/1
0.25 CAPSULE, LIQUID FILLED ORAL DAILY
Qty: 90 CAPSULE | Refills: 0 | Status: SHIPPED | OUTPATIENT
Start: 2020-04-15 | End: 2020-05-20 | Stop reason: SDUPTHER

## 2020-04-15 RX ORDER — FERROUS SULFATE 325(65) MG
325 TABLET ORAL DAILY
Qty: 90 TABLET | Refills: 0 | Status: SHIPPED | OUTPATIENT
Start: 2020-04-15 | End: 2020-05-20 | Stop reason: SDUPTHER

## 2020-04-15 RX ORDER — INSULIN GLARGINE 100 [IU]/ML
85 INJECTION, SOLUTION SUBCUTANEOUS 2 TIMES DAILY
Qty: 5 VIAL | Refills: 5 | Status: SHIPPED | OUTPATIENT
Start: 2020-04-15 | End: 2020-05-20 | Stop reason: SDUPTHER

## 2020-04-15 RX ORDER — IBUPROFEN 200 MG
1 TABLET ORAL DAILY
Qty: 200 EACH | Refills: 3 | Status: SHIPPED | OUTPATIENT
Start: 2020-04-15 | End: 2020-05-20 | Stop reason: SDUPTHER

## 2020-04-15 RX ORDER — ARIPIPRAZOLE 20 MG/1
20 TABLET ORAL DAILY
Qty: 90 TABLET | Refills: 0 | Status: SHIPPED | OUTPATIENT
Start: 2020-04-15 | End: 2020-05-20 | Stop reason: SDUPTHER

## 2020-04-15 RX ORDER — IBUPROFEN 600 MG/1
600 TABLET ORAL EVERY 6 HOURS PRN
Qty: 30 TABLET | Refills: 0 | Status: SHIPPED | OUTPATIENT
Start: 2020-04-15 | End: 2020-01-01

## 2020-04-15 RX ORDER — ATORVASTATIN CALCIUM 10 MG/1
10 TABLET, FILM COATED ORAL DAILY
Qty: 90 TABLET | Refills: 0 | Status: SHIPPED | OUTPATIENT
Start: 2020-04-15 | End: 2020-05-20 | Stop reason: SDUPTHER

## 2020-04-15 RX ORDER — BLOOD PRESSURE TEST KIT
1 KIT MISCELLANEOUS DAILY
Qty: 1 KIT | Refills: 0 | Status: SHIPPED | OUTPATIENT
Start: 2020-04-15

## 2020-04-15 RX ORDER — ETODOLAC 500 MG/1
500 TABLET, FILM COATED ORAL 2 TIMES DAILY
Qty: 60 TABLET | Refills: 0 | Status: ON HOLD | OUTPATIENT
Start: 2020-04-15 | End: 2021-01-01 | Stop reason: HOSPADM

## 2020-04-15 RX ORDER — PRAZOSIN HYDROCHLORIDE 5 MG/1
10 CAPSULE ORAL NIGHTLY
Qty: 180 CAPSULE | Refills: 0 | Status: SHIPPED | OUTPATIENT
Start: 2020-04-15 | End: 2020-05-20 | Stop reason: SDUPTHER

## 2020-04-15 RX ORDER — HYDROXYCHLOROQUINE SULFATE 200 MG/1
200 TABLET, FILM COATED ORAL 2 TIMES DAILY
Qty: 120 TABLET | Refills: 0 | Status: SHIPPED | OUTPATIENT
Start: 2020-04-15 | End: 2020-06-14

## 2020-04-15 RX ORDER — TRAZODONE HYDROCHLORIDE 100 MG/1
100 TABLET ORAL NIGHTLY
Qty: 45 TABLET | Refills: 0 | Status: SHIPPED | OUTPATIENT
Start: 2020-04-15 | End: 2020-01-01 | Stop reason: ALTCHOICE

## 2020-04-15 RX ORDER — MULTIVITAMIN
1 TABLET ORAL DAILY
Qty: 90 TABLET | Refills: 0 | Status: SHIPPED | OUTPATIENT
Start: 2020-04-15 | End: 2020-01-01

## 2020-04-15 RX ORDER — PHENOL 1.4 %
10 AEROSOL, SPRAY (ML) MUCOUS MEMBRANE NIGHTLY
Qty: 90 TABLET | Refills: 0 | Status: SHIPPED | OUTPATIENT
Start: 2020-04-15 | End: 2020-01-01 | Stop reason: SDUPTHER

## 2020-04-15 RX ORDER — LISINOPRIL 5 MG/1
5 TABLET ORAL DAILY
Qty: 90 TABLET | Refills: 0 | Status: SHIPPED | OUTPATIENT
Start: 2020-04-15 | End: 2020-05-20 | Stop reason: SDUPTHER

## 2020-05-06 ENCOUNTER — HOSPITAL ENCOUNTER (OUTPATIENT)
Dept: INFUSION THERAPY | Age: 65
Discharge: HOME | End: 2020-05-06
Payer: MEDICARE

## 2020-05-06 LAB
ALBUMIN SERPL-MCNC: 3.9 GM/DL (ref 3.4–5)
ALP BLD-CCNC: 59 IU/L (ref 40–129)
ALT SERPL-CCNC: 25 U/L (ref 10–40)
ANION GAP SERPL CALCULATED.3IONS-SCNC: 10 MMOL/L (ref 4–16)
AST SERPL-CCNC: 27 IU/L (ref 15–37)
BASOPHILS ABSOLUTE: 0 K/CU MM
BASOPHILS RELATIVE PERCENT: 0.3 % (ref 0–1)
BILIRUB SERPL-MCNC: 0.5 MG/DL (ref 0–1)
BUN BLDV-MCNC: 14 MG/DL (ref 6–23)
CALCIUM SERPL-MCNC: 8.8 MG/DL (ref 8.3–10.6)
CHLORIDE BLD-SCNC: 97 MMOL/L (ref 99–110)
CO2: 27 MMOL/L (ref 21–32)
CREAT SERPL-MCNC: 0.7 MG/DL (ref 0.9–1.3)
DIFFERENTIAL TYPE: ABNORMAL
EOSINOPHILS ABSOLUTE: 0 K/CU MM
EOSINOPHILS RELATIVE PERCENT: 0.9 % (ref 0–3)
FERRITIN: 85 NG/ML (ref 30–400)
GFR AFRICAN AMERICAN: >60 ML/MIN/1.73M2
GFR NON-AFRICAN AMERICAN: >60 ML/MIN/1.73M2
GLUCOSE BLD-MCNC: 331 MG/DL (ref 70–99)
HCT VFR BLD CALC: 42.6 % (ref 42–52)
HEMOGLOBIN: 14.3 GM/DL (ref 13.5–18)
IRON: 66 UG/DL (ref 59–158)
LYMPHOCYTES ABSOLUTE: 0.5 K/CU MM
LYMPHOCYTES RELATIVE PERCENT: 14 % (ref 24–44)
MCH RBC QN AUTO: 30.2 PG (ref 27–31)
MCHC RBC AUTO-ENTMCNC: 33.6 % (ref 32–36)
MCV RBC AUTO: 90.1 FL (ref 78–100)
MONOCYTES ABSOLUTE: 0.2 K/CU MM
MONOCYTES RELATIVE PERCENT: 5.6 % (ref 0–4)
PCT TRANSFERRIN: 16 % (ref 10–44)
PDW BLD-RTO: 14.3 % (ref 11.7–14.9)
PLATELET # BLD: 37 K/CU MM (ref 140–440)
PMV BLD AUTO: 10.7 FL (ref 7.5–11.1)
POTASSIUM SERPL-SCNC: 4.4 MMOL/L (ref 3.5–5.1)
RBC # BLD: 4.73 M/CU MM (ref 4.6–6.2)
SEGMENTED NEUTROPHILS ABSOLUTE COUNT: 2.5 K/CU MM
SEGMENTED NEUTROPHILS RELATIVE PERCENT: 79.2 % (ref 36–66)
SODIUM BLD-SCNC: 134 MMOL/L (ref 135–145)
TOTAL IRON BINDING CAPACITY: 406 UG/DL (ref 250–450)
TOTAL PROTEIN: 6.8 GM/DL (ref 6.4–8.2)
UNSATURATED IRON BINDING CAPACITY: 340 UG/DL (ref 110–370)
WBC # BLD: 3.2 K/CU MM (ref 4–10.5)

## 2020-05-06 PROCEDURE — 83540 ASSAY OF IRON: CPT

## 2020-05-06 PROCEDURE — 99211 OFF/OP EST MAY X REQ PHY/QHP: CPT

## 2020-05-06 PROCEDURE — 82105 ALPHA-FETOPROTEIN SERUM: CPT

## 2020-05-06 PROCEDURE — 82728 ASSAY OF FERRITIN: CPT

## 2020-05-06 PROCEDURE — 80053 COMPREHEN METABOLIC PANEL: CPT

## 2020-05-06 PROCEDURE — 85025 COMPLETE CBC W/AUTO DIFF WBC: CPT

## 2020-05-06 PROCEDURE — 83550 IRON BINDING TEST: CPT

## 2020-05-06 PROCEDURE — 36415 COLL VENOUS BLD VENIPUNCTURE: CPT

## 2020-05-07 LAB — MS ALPHA-FETOPROTEIN: 4 NG/ML (ref 0–9)

## 2020-05-20 ENCOUNTER — TELEPHONE (OUTPATIENT)
Dept: FAMILY MEDICINE CLINIC | Age: 65
End: 2020-05-20

## 2020-05-20 ENCOUNTER — OFFICE VISIT (OUTPATIENT)
Dept: FAMILY MEDICINE CLINIC | Age: 65
End: 2020-05-20
Payer: MEDICARE

## 2020-05-20 VITALS
OXYGEN SATURATION: 93 % | HEIGHT: 67 IN | WEIGHT: 213.6 LBS | SYSTOLIC BLOOD PRESSURE: 124 MMHG | DIASTOLIC BLOOD PRESSURE: 68 MMHG | TEMPERATURE: 97.6 F | BODY MASS INDEX: 33.53 KG/M2 | HEART RATE: 90 BPM

## 2020-05-20 VITALS
HEIGHT: 67 IN | HEART RATE: 90 BPM | SYSTOLIC BLOOD PRESSURE: 124 MMHG | BODY MASS INDEX: 33.53 KG/M2 | TEMPERATURE: 97.6 F | DIASTOLIC BLOOD PRESSURE: 68 MMHG | OXYGEN SATURATION: 93 % | WEIGHT: 213.6 LBS

## 2020-05-20 PROCEDURE — 3017F COLORECTAL CA SCREEN DOC REV: CPT | Performed by: NURSE PRACTITIONER

## 2020-05-20 PROCEDURE — G8417 CALC BMI ABV UP PARAM F/U: HCPCS | Performed by: NURSE PRACTITIONER

## 2020-05-20 PROCEDURE — G0438 PPPS, INITIAL VISIT: HCPCS | Performed by: NURSE PRACTITIONER

## 2020-05-20 PROCEDURE — 99213 OFFICE O/P EST LOW 20 MIN: CPT | Performed by: NURSE PRACTITIONER

## 2020-05-20 PROCEDURE — 3051F HG A1C>EQUAL 7.0%<8.0%: CPT | Performed by: NURSE PRACTITIONER

## 2020-05-20 PROCEDURE — 2022F DILAT RTA XM EVC RTNOPTHY: CPT | Performed by: NURSE PRACTITIONER

## 2020-05-20 PROCEDURE — G8427 DOCREV CUR MEDS BY ELIG CLIN: HCPCS | Performed by: NURSE PRACTITIONER

## 2020-05-20 PROCEDURE — 4004F PT TOBACCO SCREEN RCVD TLK: CPT | Performed by: NURSE PRACTITIONER

## 2020-05-20 PROCEDURE — 99406 BEHAV CHNG SMOKING 3-10 MIN: CPT | Performed by: NURSE PRACTITIONER

## 2020-05-20 RX ORDER — ARIPIPRAZOLE 20 MG/1
20 TABLET ORAL DAILY
Qty: 90 TABLET | Refills: 0 | Status: SHIPPED | OUTPATIENT
Start: 2020-05-20 | End: 2020-01-01 | Stop reason: SDUPTHER

## 2020-05-20 RX ORDER — IBUPROFEN 200 MG
1 TABLET ORAL DAILY
Qty: 200 EACH | Refills: 3 | Status: SHIPPED | OUTPATIENT
Start: 2020-05-20 | End: 2020-01-01 | Stop reason: SDUPTHER

## 2020-05-20 RX ORDER — ATORVASTATIN CALCIUM 10 MG/1
10 TABLET, FILM COATED ORAL DAILY
Qty: 90 TABLET | Refills: 0 | Status: SHIPPED | OUTPATIENT
Start: 2020-05-20 | End: 2020-01-01 | Stop reason: SDUPTHER

## 2020-05-20 RX ORDER — FERROUS SULFATE 325(65) MG
325 TABLET ORAL DAILY
Qty: 90 TABLET | Refills: 0 | Status: SHIPPED | OUTPATIENT
Start: 2020-05-20 | End: 2020-01-01

## 2020-05-20 RX ORDER — INSULIN GLARGINE 100 [IU]/ML
85 INJECTION, SOLUTION SUBCUTANEOUS 2 TIMES DAILY
Qty: 5 VIAL | Refills: 5 | Status: ON HOLD | OUTPATIENT
Start: 2020-05-20 | End: 2021-01-01 | Stop reason: SDUPTHER

## 2020-05-20 RX ORDER — PRAZOSIN HYDROCHLORIDE 5 MG/1
10 CAPSULE ORAL NIGHTLY
Qty: 180 CAPSULE | Refills: 0 | Status: SHIPPED | OUTPATIENT
Start: 2020-05-20 | End: 2020-01-01 | Stop reason: SDUPTHER

## 2020-05-20 RX ORDER — SENNA PLUS 8.6 MG/1
1 TABLET ORAL NIGHTLY
Qty: 90 TABLET | Refills: 0 | Status: SHIPPED | OUTPATIENT
Start: 2020-05-20 | End: 2020-01-01

## 2020-05-20 RX ORDER — LISINOPRIL 5 MG/1
5 TABLET ORAL DAILY
Qty: 90 TABLET | Refills: 0 | Status: SHIPPED | OUTPATIENT
Start: 2020-05-20 | End: 2020-01-01 | Stop reason: SDUPTHER

## 2020-05-20 RX ORDER — CALCITRIOL 0.25 UG/1
0.25 CAPSULE, LIQUID FILLED ORAL DAILY
Qty: 90 CAPSULE | Refills: 0 | Status: SHIPPED | OUTPATIENT
Start: 2020-05-20 | End: 2020-01-01 | Stop reason: SDUPTHER

## 2020-05-20 ASSESSMENT — ENCOUNTER SYMPTOMS
NAUSEA: 0
COUGH: 0
BACK PAIN: 1
VOMITING: 0
DIARRHEA: 0
CONSTIPATION: 0
BLOOD IN STOOL: 0
ABDOMINAL PAIN: 0
SHORTNESS OF BREATH: 0

## 2020-05-20 ASSESSMENT — LIFESTYLE VARIABLES: HOW OFTEN DO YOU HAVE A DRINK CONTAINING ALCOHOL: 0

## 2020-05-20 ASSESSMENT — PATIENT HEALTH QUESTIONNAIRE - PHQ9
SUM OF ALL RESPONSES TO PHQ QUESTIONS 1-9: 2
SUM OF ALL RESPONSES TO PHQ QUESTIONS 1-9: 2

## 2020-05-20 NOTE — PROGRESS NOTES
MG TABS Take 10 tablets by mouth nightly Yes Roselyn Davila APRN - NP   hydroxychloroquine (PLAQUENIL) 200 MG tablet Take 1 tablet by mouth 2 times daily Yes Roselyn Davila APRN - NP   Blood Pressure KIT 1 kit by Does not apply route daily Yes Roselyn Davila APRN - NP   albuterol sulfate HFA (PROAIR HFA) 108 (90 Base) MCG/ACT inhaler Inhale 2 puffs into the lungs every 6 hours as needed for Wheezing Yes Historical Provider, MD   Polyvinyl Alcohol-Povidone (REFRESH OP) Apply to eye Yes Historical Provider, MD   methadone (DOLOPHINE) 5 MG tablet Take 5 mg by mouth every 12 hours as needed for Pain. Yes Historical Provider, MD   oxyCODONE (ROXICODONE) 5 MG immediate release tablet Take 10 mg by mouth every 6 hours as needed for Pain. . Yes Historical Provider, MD   baclofen (LIORESAL) 20 MG tablet Take 20 mg by mouth 3 times daily Yes Historical Provider, MD   pregabalin (LYRICA) 100 MG capsule Take 100 mg by mouth 3 times daily. Yes Historical Provider, MD   ibuprofen (ADVIL;MOTRIN) 600 MG tablet Take 1 tablet by mouth every 6 hours as needed for Pain  Roselyn Davila APRN Barbara NP   etodolac (LODINE) 500 MG tablet Take 1 tablet by mouth 2 times daily  CAROLANN Muller NP        Social History     Tobacco Use    Smoking status: Current Every Day Smoker     Packs/day: 0.50     Types: Cigarettes    Smokeless tobacco: Never Used   Substance Use Topics    Alcohol use: No        Vitals:    05/20/20 0914   BP: 124/68   Site: Left Upper Arm   Position: Sitting   Pulse: 90   Temp: 97.6 °F (36.4 °C)   SpO2: 93%   Weight: 213 lb 9.6 oz (96.9 kg)   Height: 5' 6.5\" (1.689 m)     Estimated body mass index is 33.96 kg/m² as calculated from the following:    Height as of this encounter: 5' 6.5\" (1.689 m). Weight as of this encounter: 213 lb 9.6 oz (96.9 kg). Physical Exam  Vitals signs reviewed. Constitutional:       Appearance: Normal appearance. He is obese.    HENT:      Head: Normocephalic and atraumatic. Nose: Nose normal.   Eyes:      Extraocular Movements: Extraocular movements intact. Pupils: Pupils are equal, round, and reactive to light. Neck:      Musculoskeletal: Normal range of motion. Cardiovascular:      Rate and Rhythm: Normal rate and regular rhythm. Heart sounds: Normal heart sounds. Pulmonary:      Effort: Pulmonary effort is normal.      Breath sounds: Normal breath sounds. Abdominal:      General: Bowel sounds are normal. There is no distension. Palpations: Abdomen is soft. There is no mass. Tenderness: There is no abdominal tenderness. Hernia: No hernia is present. Musculoskeletal: Normal range of motion. Skin:     General: Skin is warm and dry. Findings: No petechiae, rash or wound. Comments: Red patchy eczema on face    Neurological:      General: No focal deficit present. Mental Status: He is alert and oriented to person, place, and time. Mental status is at baseline. Psychiatric:         Mood and Affect: Mood normal.         Behavior: Behavior normal.         Thought Content: Thought content normal.         Judgment: Judgment normal.         ASSESSMENT/PLAN:  1. Bipolar affective disorder, current episode mixed, current episode severity unspecified ([facility])  - ARIPiprazole (ABILIFY) 20 MG tablet; Take 1 tablet by mouth daily  Dispense: 90 tablet; Refill: 0    2. Severe single current episode of major depressive disorder, with psychotic features ([address]  - ARIPiprazole (ABILIFY) 20 MG tablet; Take 1 tablet by mouth daily  Dispense: 90 tablet; Refill: 0    3. Type 2 diabetes mellitus with diabetic neuropathy, with long-term current use of insulin ([facility])  - Amb External Referral To Podiatry  - atorvastatin (LIPITOR) 10 MG tablet; Take 1 tablet by mouth daily  Dispense: 90 tablet;  Refill: 0  - insulin aspart (NOVOLOG) 100 UNIT/ML injection vial; Inject 30 Units into the skin 3 times daily (before meals)  Dispense: 3 vial; Refill: 5  - insulin glargine (LANTUS) 100 UNIT/ML injection vial; Inject 85 Units into the skin 2 times daily  Dispense: 5 vial; Refill: 5  - INSULIN SYRINGE 1CC/29G ([employer] INS SYRINGE 1CC/29G) 29G X 1/2\" 1 ML MISC; 1 each by Does not apply route daily  Dispense: 200 each; Refill: 3    4. Other secondary thrombocytopenia  - ferrous sulfate (IRON 325) 325 (65 Fe) MG tablet; Take 1 tablet by mouth daily  Dispense: 90 tablet; Refill: 0    5. Essential hypertension  - lisinopril (PRINIVIL;ZESTRIL) 5 MG tablet; Take 1 tablet by mouth daily  Dispense: 90 tablet; Refill: 0  - prazosin (MINIPRESS) 5 MG capsule; Take 2 capsules by mouth nightly  Dispense: 180 capsule; Refill: 0    6. Psychophysiological insomnia         PLAN:  Refill medications as requested. And insulin syringes. 2 boxes   Smoking cessation  Exercise and weight loss  Check hgba1c in July 2020. May be able to decrease amount of insulin he is taking. Return for end of july for fasting labs and check up . An electronic signature was used to authenticate this note.     --CAROLANN Inman NP on 5/20/2020 at 11:02 PM

## 2020-05-21 NOTE — PATIENT INSTRUCTIONS
your doctor before you try any diet. These conditions include kidney disease, heart disease, type 2 diabetes, high cholesterol, and high blood pressure. If you are pregnant, it may not be safe for your baby if you are on a low-carb diet. How can you lose weight safely? You might have heard that a diet plan helped another person lose weight. But that doesn't mean that it will work for you. It is very hard to stay on a diet that includes lots of big changes in your eating habits. If you want to get to a healthy weight and stay there, making healthy lifestyle changes will often work better than dieting. These steps can help. · Make a plan for change. Work with your doctor to create a plan that is right for you. · See a dietitian. He or she can show you how to make healthy changes in your eating habits. · Manage stress. If you have a lot of stress in your life, it can be hard to focus on making healthy changes to your daily habits. · Track your food and activity. You are likely to do better at losing weight if you keep track of what you eat and what you do. Follow-up care is a key part of your treatment and safety. Be sure to make and go to all appointments, and call your doctor if you are having problems. It's also a good idea to know your test results and keep a list of the medicines you take. Where can you learn more? Go to https://[employer]. org and sign in to your [employer] account. Enter A121 in the [facility] box to learn more about \"Learning About Low-Carbohydrate Diets for Weight Loss. \"     If you do not have an account, please click on the \"Sign Up Now\" link. Current as of: August 21, 2019Content Version: 12.4  © 6495-4783 Healthwise, Incorporated. Care instructions adapted under license by [facility] ([facility]).  If you have questions about a medical condition or this instruction, always ask your healthcare professional. Velvet Mccormick disclaims any warranty or liability for your use of this information. Personalized Preventive Plan for Adry Koenig - 5/20/2020  Medicare offers a range of preventive health benefits. Some of the tests and screenings are paid in full while other may be subject to a deductible, co-insurance, and/or copay. Some of these benefits include a comprehensive review of your medical history including lifestyle, illnesses that may run in your family, and various assessments and screenings as appropriate. After reviewing your medical record and screening and assessments performed today your provider may have ordered immunizations, labs, imaging, and/or referrals for you. A list of these orders (if applicable) as well as your Preventive Care list are included within your After Visit Summary for your review. Other Preventive Recommendations:    · A preventive eye exam performed by an eye specialist is recommended every 1-2 years to screen for glaucoma; cataracts, macular degeneration, and other eye disorders. · A preventive dental visit is recommended every 6 months. · Try to get at least 150 minutes of exercise per week or 10,000 steps per day on a pedometer . · Order or download the FREE \"Exercise & Physical Activity: Your Everyday Guide\" from [employer]. Call 1-942.636.6154 or search [employer] online. · You need 2200-2918 mg of calcium and 2728-8315 IU of vitamin D per day. It is possible to meet your calcium requirement with diet alone, but a vitamin D supplement is usually necessary to meet this goal.  · When exposed to the sun, use a sunscreen that protects against both UVA and UVB radiation with an SPF of 30 or greater. Reapply every 2 to 3 hours or after sweating, drying off with a towel, or swimming. · Always wear a seat belt when traveling in a car. Always wear a helmet when riding a bicycle or motorcycle.

## 2020-05-21 NOTE — PROGRESS NOTES
Medicare Annual Wellness Visit  Name: Chio Zavala Date: 2020   MRN: [id number] Sex: Male   Age: 59 y.o. Ethnicity: Non-/Non    : 1955 Race: Beau Koenig is here for Medicare AWV    Screenings for behavioral, psychosocial and functional/safety risks, and cognitive dysfunction are all negative except as indicated below. These results, as well as other patient data from the [address] form, are documented in Flowsheets linked to this Encounter. Allergies   Allergen Reactions    Latex     Acetaminophen      endstage liver disease    Bee Venom     Haldol [Haloperidol Lactate]     Nsaids      End stage liver disease    Nuts [Peanut-Containing Drug Products]     Other Swelling     insects         Prior to Visit Medications    Medication Sig Taking? Authorizing Provider   traZODone (DESYREL) 100 MG tablet Take 1 tablet by mouth nightly Yes CAROLANN Berry NP   Multiple Vitamin (DAILY WILLEM) TABS Take 1 tablet by mouth daily Indications: supplement Yes CAROLANN Berry NP   Melatonin 10 MG TABS Take 10 tablets by mouth nightly Yes CAROLANN Berry NP   hydroxychloroquine (PLAQUENIL) 200 MG tablet Take 1 tablet by mouth 2 times daily Yes CAROLANN Berry NP   Blood Pressure KIT 1 kit by Does not apply route daily Yes CAROLANN Berry NP   albuterol sulfate HFA (PROAIR HFA) 108 (90 Base) MCG/ACT inhaler Inhale 2 puffs into the lungs every 6 hours as needed for Wheezing Yes Historical Provider, MD   Polyvinyl Alcohol-Povidone (REFRESH OP) Apply to eye Yes Historical Provider, MD   methadone (DOLOPHINE) 5 MG tablet Take 5 mg by mouth every 12 hours as needed for Pain. Yes Historical Provider, MD   oxyCODONE (ROXICODONE) 5 MG immediate release tablet Take 10 mg by mouth every 6 hours as needed for Pain. . Yes Historical Provider, MD   baclofen (LIORESAL) 20 MG tablet Take 20 mg by mouth 3 times daily Yes Historical Provider, MD   pregabalin (LYRICA) 100 MG capsule Take 100 mg by mouth 3 times daily.  Yes Historical Provider, MD   ARIPiprazole (ABILIFY) 20 MG tablet Take 1 tablet by mouth daily  CAROLANN Liu NP   atorvastatin (LIPITOR) 10 MG tablet Take 1 tablet by mouth daily  CAROLANN Liu NP   calcitRIOL (ROCALTROL) 0.25 MCG capsule Take 1 capsule by mouth daily Indications: supplement  CAROLANN Grace NP   ferrous sulfate (IRON 325) 325 (65 Fe) MG tablet Take 1 tablet by mouth daily  CAROLANN Liu NP   insulin aspart (NOVOLOG) 100 UNIT/ML injection vial Inject 30 Units into the skin 3 times daily (before meals)  CAROLANN Liu NP   insulin glargine (LANTUS) 100 UNIT/ML injection vial Inject 85 Units into the skin 2 times daily  CAROLANN Liu NP   INSULIN SYRINGE 1CC/29G ([employer] INS SYRINGE 1CC/29G) 29G X 1/2\" 1 ML MISC 1 each by Does not apply route daily  CAROLANN Liu NP   lisinopril (PRINIVIL;ZESTRIL) 5 MG tablet Take 1 tablet by mouth daily  CAROLANN Liu NP   prazosin (MINIPRESS) 5 MG capsule Take 2 capsules by mouth nightly  CAROLANN Liu NP   senna (SENOKOT) 8.6 MG tablet Take 1 tablet by mouth nightly  CAROLANN Liu NP   ibuprofen (ADVIL;MOTRIN) 600 MG tablet Take 1 tablet by mouth every 6 hours as needed for Pain  CAROLANN Liu NP   etodolac (LODINE) 500 MG tablet Take 1 tablet by mouth 2 times daily  CAROLANN Liu NP         Past Medical History:   Diagnosis Date    Arthritis     Bipolar 1 disorder ([address]     Chronic airway obstruction, not elsewhere classified 10/28/2013    Chronic major depressive disorder, recurrent episode ([address]     Diabetes mellitus ([address]     GERD (gastroesophageal reflux disease)     Hepatitis C     Hypertension     Low back pain     Post traumatic stress disorder     Schizo affective schizophrenia ([address]     per old chart pt in ER 7/28/2017- mg/dl  · Maintain LDL cholesterol levels under 3.0 mmol/l or 115 mg/dl   · Control blood glucose levels  · Consider taking aspirin (75 mg daily), once blood pressure is controlled   Provided a follow up plan.   Time spent (minutes): 10 minutes        CAROLANN Walsh - NP

## 2020-06-03 RX ORDER — ETODOLAC 500 MG/1
500 TABLET, FILM COATED ORAL 2 TIMES DAILY
Qty: 60 TABLET | Refills: 0 | OUTPATIENT
Start: 2020-06-03 | End: 2020-01-01

## 2020-06-19 ENCOUNTER — HOSPITAL ENCOUNTER (OUTPATIENT)
Age: 65
Discharge: HOME | End: 2020-06-19
Payer: MEDICARE

## 2020-06-19 LAB
ALT SERPL-CCNC: 23 U/L (ref 10–40)
AST SERPL-CCNC: 29 IU/L (ref 15–37)
BUN BLDV-MCNC: 14 MG/DL (ref 6–23)
CREAT SERPL-MCNC: 0.7 MG/DL (ref 0.9–1.3)
EKG ATRIAL RATE: 89 BPM
EKG DIAGNOSIS: NORMAL
EKG P AXIS: 61 DEGREES
EKG P-R INTERVAL: 176 MS
EKG Q-T INTERVAL: 358 MS
EKG QRS DURATION: 88 MS
EKG QTC CALCULATION (BAZETT): 435 MS
EKG R AXIS: 46 DEGREES
EKG T AXIS: 39 DEGREES
EKG VENTRICULAR RATE: 89 BPM
ERYTHROCYTE SEDIMENTATION RATE: 7 MM/HR (ref 0–20)
GFR AFRICAN AMERICAN: >60 ML/MIN/1.73M2
GFR NON-AFRICAN AMERICAN: >60 ML/MIN/1.73M2

## 2020-06-19 PROCEDURE — 82565 ASSAY OF CREATININE: CPT

## 2020-06-19 PROCEDURE — 84520 ASSAY OF UREA NITROGEN: CPT

## 2020-06-19 PROCEDURE — 36415 COLL VENOUS BLD VENIPUNCTURE: CPT

## 2020-06-19 PROCEDURE — 85652 RBC SED RATE AUTOMATED: CPT

## 2020-06-19 PROCEDURE — 84460 ALANINE AMINO (ALT) (SGPT): CPT

## 2020-06-19 PROCEDURE — 93005 ELECTROCARDIOGRAM TRACING: CPT | Performed by: PHYSICIAN ASSISTANT

## 2020-06-19 PROCEDURE — 86160 COMPLEMENT ANTIGEN: CPT

## 2020-06-19 PROCEDURE — 84450 TRANSFERASE (AST) (SGOT): CPT

## 2020-06-19 PROCEDURE — 93010 ELECTROCARDIOGRAM REPORT: CPT | Performed by: INTERNAL MEDICINE

## 2020-06-21 LAB
COMPLEMENT C3: 96 MG/DL (ref 88–201)
COMPLEMENT C4: 8 MG/DL (ref 10–40)

## 2020-07-24 NOTE — PROGRESS NOTES
2020     Oren Koenig (:  1955) is a 59 y.o. male, here for evaluation of the following medical concerns:      Presents for follow up on chronic problems and med refills. Left the  nursing home in 2020. Is living in a senior apartment on his own.      Following with pain management/ elaina for lyrica, baclofen, oxycodone,  Methadone. Has been requesting me to fill his trazadone, I will not. Takes melatonin 10mg dose for insomnia as well.      Following with Dr. Ramón Rowe hematology for \"blood disorder\". \"for something he has not classified yet\"  Has petechia and low platelets. Dr. Ayanna Dillon keeps up with his labs.      Needs to follow up with GI/ Dr. Janet Villaseñor; just finished epclusa and is wanting a recheck on hepatitis C. Has an appointment with him Tuesday. Follows with rheumatology for rheum arthritis as he is on plaquenil and two different NSAIDS. Ibuprofen and Lodine. Saw Jose Saleem, keeps wanting me to refill his plaquenil, lodine.      States he has been eating less carbs since and seeing a change in his glucose. Glucose is running 150-160  30 units of novolog with each meal TID. 5 units at HS  80 units lantus BID  Checks glucose before each meal and at HS     Cut down smoking to 10 cigarettes per day, was attempting to quit.    is now smoking 1 PPD. Does not want to quit. Is bored with COVID lockdown     \"feels run down, tired generally fatigued chronically\"     urinary frequency: since he was a kid. Always wetting the bed since a child, still bed wetting. Micky Alvarado a very small bladder because it folded over\". Has seen urology decades ago in [place]. (( has wet his pants today in office. .. )))         Review of Systems   Constitutional: Negative for activity change, appetite change, chills, diaphoresis, fatigue and fever. Eyes: Negative for visual disturbance. Respiratory: Negative for cough and shortness of breath. Cardiovascular: Negative for chest pain and palpitations. Gastrointestinal: Negative for abdominal pain, blood in stool, constipation, diarrhea, nausea and vomiting. Genitourinary: Positive for enuresis and frequency. Negative for decreased urine volume, difficulty urinating, dysuria, hematuria and urgency. Musculoskeletal: Positive for arthralgias and back pain. Negative for gait problem. Skin: Negative for rash and wound. Neurological: Negative for dizziness, weakness, light-headedness and headaches. Hematological: Does not bruise/bleed easily. Psychiatric/Behavioral: Positive for sleep disturbance. Negative for dysphoric mood and suicidal ideas. The patient is not nervous/anxious. Prior to Visit Medications    Medication Sig Taking?  Authorizing Provider   ARIPiprazole (ABILIFY) 20 MG tablet Take 1 tablet by mouth daily Yes CAROLANN Ackerman NP   atorvastatin (LIPITOR) 10 MG tablet Take 1 tablet by mouth daily Yes CAROLANN Ackerman NP   calcitRIOL (ROCALTROL) 0.25 MCG capsule Take 1 capsule by mouth daily Indications: supplement Yes CAROLANN Ackerman NP   ferrous sulfate (IRON 325) 325 (65 Fe) MG tablet Take 1 tablet by mouth daily Yes CAROLANN Ackerman NP   insulin aspart (NOVOLOG) 100 UNIT/ML injection vial Inject 30 Units into the skin 3 times daily (before meals) Yes CAROLANN Ackerman NP   insulin glargine (LANTUS) 100 UNIT/ML injection vial Inject 85 Units into the skin 2 times daily Yes CAROLANN Ackerman NP   INSULIN SYRINGE 1CC/29G ([employer] INS SYRINGE 1CC/29G) 29G X 1/2\" 1 ML MISC 1 each by Does not apply route daily Yes CAROLANN Ackerman NP   lisinopril (PRINIVIL;ZESTRIL) 5 MG tablet Take 1 tablet by mouth daily Yes CAROLANN Ackerman NP   prazosin (MINIPRESS) 5 MG capsule Take 2 capsules by mouth nightly Yes CAROLANN Ackerman NP   senna (SENOKOT) 8.6 MG tablet Take 1 tablet by mouth nightly Yes CAROLANN Ackerman NP   Blood Pressure KIT 1 kit by Does not apply route daily Yes CAROLANN Wilson NP   albuterol sulfate HFA (PROAIR HFA) 108 (90 Base) MCG/ACT inhaler Inhale 2 puffs into the lungs every 6 hours as needed for Wheezing Yes Historical Provider, MD   Polyvinyl Alcohol-Povidone (REFRESH OP) Apply to eye Yes Historical Provider, MD   methadone (DOLOPHINE) 5 MG tablet Take 5 mg by mouth every 12 hours as needed for Pain. Yes Historical Provider, MD   oxyCODONE (ROXICODONE) 5 MG immediate release tablet Take 10 mg by mouth every 6 hours as needed for Pain. . Yes Historical Provider, MD   baclofen (LIORESAL) 20 MG tablet Take 20 mg by mouth 3 times daily Yes Historical Provider, MD   pregabalin (LYRICA) 100 MG capsule Take 100 mg by mouth 3 times daily. Yes Historical Provider, MD   traZODone (DESYREL) 100 MG tablet Take 1 tablet by mouth nightly  CAROLANN Wilson NP   Multiple Vitamin (DAILY WILLEM) TABS Take 1 tablet by mouth daily Indications: supplement  CAROLANN Grace NP   Melatonin 10 MG TABS Take 10 tablets by mouth nightly  CAROLANN Wilson NP   ibuprofen (ADVIL;MOTRIN) 600 MG tablet Take 1 tablet by mouth every 6 hours as needed for Pain  CAROLANN Wilson NP   etodolac (LODINE) 500 MG tablet Take 1 tablet by mouth 2 times daily  CAROLANN Wilson NP        Social History     Tobacco Use    Smoking status: Current Every Day Smoker     Packs/day: 1.00     Types: Cigarettes    Smokeless tobacco: Never Used   Substance Use Topics    Alcohol use: No        Vitals:    07/24/20 0926   BP: 116/72   Site: Left Upper Arm   Position: Sitting   Cuff Size: Medium Adult   Pulse: 103   Temp: 97.2 °F (36.2 °C)   SpO2: 95%   Weight: 212 lb (96.2 kg)   Height: 5' 6.5\" (1.689 m)     Estimated body mass index is 33.71 kg/m² as calculated from the following:    Height as of this encounter: 5' 6.5\" (1.689 m). Weight as of this encounter: 212 lb (96.2 kg). Physical Exam  Vitals signs reviewed.    Constitutional: 90 tablet; Refill: 0  - INSULIN SYRINGE 1CC/29G ([employer] INS SYRINGE 1CC/29G) 29G X 1/2\" 1 ML MISC; 1 each by Does not apply route daily  Dispense: 200 each; Refill: 5    3. Bipolar affective disorder, current episode mixed, current episode severity unspecified (HCC)  - ARIPiprazole (ABILIFY) 20 MG tablet; Take 1 tablet by mouth daily  Dispense: 90 tablet; Refill: 0    4. Severe single current episode of major depressive disorder, with psychotic features ([facility] [address]  - ARIPiprazole (ABILIFY) 20 MG tablet; Take 1 tablet by mouth daily  Dispense: 90 tablet; Refill: 0    5. Psychophysiological insomnia  Wanting trazadone. I will not fill. He is on too many sedatives and narcotics. - Melatonin 10 MG TABS; Take 10 tablets by mouth nightly  Dispense: 90 tablet; Refill: 0    6. Urinary frequency  Chronic problem. - POCT Urinalysis no Micro  - Psa screening  - AFL - Veda Brown MD, Urology, [facility]    7. Nocturnal enuresis  Chronic problem. - POCT Urinalysis no Micro  - AFL - Veda Brown MD, Urology, [facility]    8. Hepatic cirrhosis due to chronic hepatitis C infection ([facility]  - HEPATITIS C ANTIBODY    9. Encounter for screening for malignant neoplasm of prostate   - Psa screening    Present to the ER for any emergent or acute symptoms not managed at home or in office. Follow up with Dr. Valentina Bailey, urology[facility]      Return in about 3 months (around 10/24/2020) for HTN DM cholesterol check. An electronic signature was used to authenticate this note.     --CAROLANN Jaramillo NP on 7/24/2020 at 10:07 AM

## 2020-07-27 NOTE — RESULT ENCOUNTER NOTE
Hepatitis C antibody is reactive. Meaning he has hepatitis C. We already knew this.   Hemoglobin A1c is steadily creeping up at 7.8  Prostate screening normal

## 2020-08-10 NOTE — TELEPHONE ENCOUNTER
Patient called to get the results of labs, gave him message that they have been mailed as well due to not being able to contact him. He gave verbal understanding of results.

## 2020-08-27 NOTE — TELEPHONE ENCOUNTER
Rosanne stewart called and insulin needles were written for 1 a day and pt gives himself 5 inj in a day.  Verbal given to rosanne stewart per justin for needles to be 5 times a day
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
## 2020-09-10 NOTE — PROGRESS NOTES
MA Rooming Questions  Patient: Kuldeep Schmidt  MRN: [id number]    Date: 9/10/2020        1. Do you have any new issues?   no         2. Do you need any refills on medications?    no    3. Have you had any imaging done since your last visit? yes - U/S    4. Have you been hospitalized or seen in the emergency room since your last visit here?   no    5. Did the patient have a depression screening completed today?  No    No data recorded     PHQ-9 Given to (if applicable):               PHQ-9 Score (if applicable):                     [] Positive     []  Negative              Does question #9 need addressed (if applicable)                     [] Yes    []  No               Valerie Betancur MA

## 2020-10-30 PROBLEM — G93.40 ACUTE ENCEPHALOPATHY: Status: RESOLVED | Noted: 2017-10-23 | Resolved: 2020-01-01

## 2020-10-30 NOTE — TELEPHONE ENCOUNTER
Received a VM from a Amanda requesting a call for [facility] and other needs for patient. Attempted to call her back had to leave message .   Amanda: [phone]

## 2020-10-30 NOTE — PATIENT INSTRUCTIONS
Address: [address] [facility], [facility], [address] [facility]   Phone: [phone]  Dr. Ravindra Street - gastroenterology.

## 2020-10-30 NOTE — PROGRESS NOTES
10/30/2020     Joaquin Koenig (:  1955) is a 72 y.o. male, here for evaluation of the following medical concerns:          Presents for follow up on chronic problems and med refills. Left the  nursing home in 2020. Is living in a senior apartment on his own.      Following with pain management/ elaina for lyrica, baclofen, oxycodone. No longer taking methadone because he needed to cut back on narcotics. Has been requesting me to fill his trazadone, I will not. Takes melatonin 10mg dose for insomnia as well.      Following with Dr. Mariah Bach hematology for \"blood disorder\". \"for something he has not classified yet\"  Has petechia and low platelets. Dr. Lisa Valderrama keeps up with his labs.      Needs to follow up with GI/ Dr. Ramírez Needs; just finished epclusa and is wanting a recheck on hepatitis C. States he gags and \"retches\" often. One episode of emesis yesterday      Follows with rheumatology for rheum arthritis as he is on plaquenil and two different NSAIDS. Ibuprofen and Lodine. Saw Gabriela Royal, keeps wanting me to refill his plaquenil, lodine. Says his medications have been changed, but he is not sure to what.      Type 2 diabetes. Not eating a specific diet. Glucose is running 150-175  35 units of novolog with each meal TID. 5 units at HS  85 units lantus BID  Checks glucose before each meal and at HS  Does not want to see an endocrinologist- changes his own insulin because \"I have known my diabetes long enough, so I know what to do\"      smoking 1 PPD. Does not want to quit. Is bored with COVID lockdown    Three falls in the last month- denies injuries. \"catch myself on hands and knees and go down gently\"   Was trying to get dressed, fell when putting pants on. Walking to a store- fell outside. \"just lost balance\"  Is wanting an electric wheelchair. Ambulates with cane and walker currently. Uses the electric wheelchair at [facility]. States he has never hit his head.     Is working on getting a home health aid through in insurance. Does not want to move out of his apartment. Review of Systems   Constitutional: Negative for activity change, appetite change, chills, diaphoresis, fatigue, fever and unexpected weight change. Eyes: Negative for visual disturbance. Respiratory: Negative for cough and shortness of breath. Cardiovascular: Negative for chest pain, palpitations and leg swelling. Gastrointestinal: Negative for abdominal pain, blood in stool, constipation, nausea and vomiting. Genitourinary: Negative for difficulty urinating. Urinary incontinence   Musculoskeletal: Positive for arthralgias, back pain and gait problem. Skin: Negative. Neurological: Positive for light-headedness (contributes to falls. ). Negative for dizziness, weakness, numbness and headaches. Hematological: Bruises/bleeds easily. Psychiatric/Behavioral: Positive for dysphoric mood and sleep disturbance. Negative for suicidal ideas. The patient is nervous/anxious. Prior to Visit Medications    Medication Sig Taking?  Authorizing Provider   Ferrous Sulfate (IRON) 325 (65 Fe) MG TABS TAKE ONE (1) TABLET BY MOUTH DAILY Yes CAROLANN Bruce NP   prazosin (MINIPRESS) 5 MG capsule Take 2 capsules by mouth nightly Yes CAROLANN Bruce NP   hydroxychloroquine (PLAQUENIL) 200 MG tablet  Yes Historical Provider, MD   oxyCODONE HCl (OXY-IR) 10 MG immediate release tablet  Yes Historical Provider, MD   NARCAN 4 MG/0.1ML LIQD nasal spray  Yes Historical Provider, MD   ARIPiprazole (ABILIFY) 20 MG tablet Take 1 tablet by mouth daily Yes CAROLANN Bruce NP   atorvastatin (LIPITOR) 10 MG tablet Take 1 tablet by mouth daily Yes CAROLANN Bruce NP   calcitRIOL (ROCALTROL) 0.25 MCG capsule Take 1 capsule by mouth daily Indications: supplement Yes CAROLANN Bruce NP   INSULIN SYRINGE 1CC/29G (Pia Coronater INS SYRINGE 1CC/29G) 29G X 1/2\" 1 ML MISC 1 each by Does not apply route daily Yes CAROLANN Haney NP   lisinopril (PRINIVIL;ZESTRIL) 5 MG tablet Take 1 tablet by mouth daily Yes CAROLANN Haney NP   Melatonin 10 MG TABS Take 1 tablet by mouth nightly Yes CAROLANN Haney NP   insulin aspart (NOVOLOG) 100 UNIT/ML injection vial Inject 30 Units into the skin 3 times daily (before meals) Yes CAROLANN Haney NP   insulin glargine (LANTUS) 100 UNIT/ML injection vial Inject 85 Units into the skin 2 times daily Yes CAROLANN Haney NP   Multiple Vitamin (DAILY WILLEM) TABS Take 1 tablet by mouth daily Indications: supplement Yes CAROLANN Haney NP   etodolac (LODINE) 500 MG tablet Take 1 tablet by mouth 2 times daily Yes CAROLANN Haney NP   Blood Pressure KIT 1 kit by Does not apply route daily Yes CAROLANN Haney NP   albuterol sulfate HFA (PROAIR HFA) 108 (90 Base) MCG/ACT inhaler Inhale 2 puffs into the lungs every 6 hours as needed for Wheezing Yes Historical Provider, MD   Polyvinyl Alcohol-Povidone (REFRESH OP) Apply to eye Yes Historical Provider, MD   methadone (DOLOPHINE) 5 MG tablet Take 5 mg by mouth every 12 hours as needed for Pain. Yes Historical Provider, MD   baclofen (LIORESAL) 20 MG tablet Take 20 mg by mouth 3 times daily Yes Historical Provider, MD   pregabalin (LYRICA) 100 MG capsule Take 100 mg by mouth 3 times daily.  Yes Historical Provider, MD   ibuprofen (ADVIL;MOTRIN) 600 MG tablet Take 1 tablet by mouth every 6 hours as needed for Pain  CAROLANN Haney NP        Social History     Tobacco Use    Smoking status: Current Every Day Smoker     Packs/day: 1.00     Types: Cigarettes    Smokeless tobacco: Never Used   Substance Use Topics    Alcohol use: No        Vitals:    10/30/20 0923   BP: 126/78   Site: Left Upper Arm   Position: Sitting   Cuff Size: Large Adult   Pulse: 86   Temp: 97.2 °F (36.2 °C)   SpO2: 97%   Weight: 211 lb 12.8 oz (96.1 kg)   Height: 5' 6.5\" (1.689 m) Estimated body mass index is 33.67 kg/m² as calculated from the following:    Height as of this encounter: 5' 6.5\" (1.689 m). Weight as of this encounter: 211 lb 12.8 oz (96.1 kg). Physical Exam  Vitals signs reviewed. Constitutional:       General: He is not in acute distress. Appearance: Normal appearance. He is obese. He is not ill-appearing, toxic-appearing or diaphoretic. Comments: Wearing a hat, mask, sunglasses. Refuses to take his glasses off. HENT:      Head: Normocephalic and atraumatic. Nose: Nose normal.   Eyes:      Extraocular Movements: Extraocular movements intact. Pupils: Pupils are equal, round, and reactive to light. Neck:      Musculoskeletal: Normal range of motion and neck supple. Cardiovascular:      Rate and Rhythm: Normal rate and regular rhythm. Heart sounds: Normal heart sounds. Pulmonary:      Effort: Pulmonary effort is normal. No respiratory distress. Breath sounds: Normal breath sounds. Abdominal:      General: Bowel sounds are normal. There is no distension. Palpations: Abdomen is soft. There is no mass. Tenderness: There is no abdominal tenderness. Hernia: No hernia is present. Musculoskeletal: Normal range of motion. Comments: Ambulating with cane. Steady   Skin:     General: Skin is warm and dry. Neurological:      Mental Status: He is alert and oriented to person, place, and time. Mental status is at baseline. Psychiatric:         Thought Content: Thought content normal.      Comments: Very flat affect         ASSESSMENT/PLAN:  1. Essential hypertension  Continue current medication regimen. Check blood pressure when he feels off balance to make sure he does not have low blood pressure. Discussed what low blood pressure parameters are. Patient states understanding.  - prazosin (MINIPRESS) 5 MG capsule; Take 2 capsules by mouth nightly  Dispense: 180 capsule; Refill: 0    2.  Type 2 diabetes mellitus with diabetic neuropathy, with long-term current use of insulin (HCC)  Continue current regimen. Refuses endocrinology. Seems like glucose has been under control with what ever regimen he is following at home with his insulin. Refuses to change insulin regimen.  - POCT glycosylated hemoglobin (Hb A1C)    3. Hepatic cirrhosis due to chronic hepatitis C infection ([facility]  Follow with GI. Went through [place] regimen, but still positive for hep C    4. Simple chronic bronchitis ([facility]  Follow with pulmonology. Controlled    5. Schizophrenia, unspecified type ([facility]  Follow with mental health-denies this diagnosis    6. Bipolar affective disorder, current episode mixed, current episode severity unspecified ([facility]  Follow with mental health    7. Severe single current episode of major depressive disorder, with psychotic features ([facility]  Follow with mental health    8. Leukopenia, unspecified type  Follow with hematology    9. Other secondary thrombocytopenia  Follow with hematology  - Ferrous Sulfate (IRON) 325 (65 Fe) MG TABS; TAKE ONE (1) TABLET BY MOUTH DAILY  Dispense: 90 tablet; Refill: 0    10. Other specified anemias  Follow with hematology    11. Chronic low back pain with sciatica, sciatica laterality unspecified, unspecified back pain laterality  Follow with pain management  Advised that ice will still not give him trazodone because he is taking too many CNS depressants and narcotics. Advised that he can find another provider if he is upset that I will give trazodone -patient states he is looking for another provider. 15. Fall, sequela  Discussed electric wheelchair. Advised that he should not start with an electric wheelchair, because then his legs will just grow weaker. He needs to start with physical therapy. Work on getting home health aide  - [address] [facility]      Present to the ER for any emergent or acute symptoms not managed at home or in office.       Please note that this chart was generated using dragon dictation software. Although every effort was made to ensure the accuracy of this automated transcription, some errors in transcription may have occurred. Return in about 3 months (around 1/30/2021) for HTN DM cholesterol check. An electronic signature was used to authenticate this note.     --CAROLANN Frazier NP on 10/30/2020 at 12:45 PM

## 2020-11-03 NOTE — PROGRESS NOTES
Physical Therapy  Cancellation/No-show Note  Patient Name:  Gianni Figueroa  :  1955   Date:  11/3/2020  Cancelled visits to date: 1  No-shows to date: 0    For today's appointment patient:  [x]  Cancelled  []  Rescheduled appointment  []  No-show     Reason given by patient:  []  Patient ill  []  Conflicting appointment  []  No transportation    []  Conflict with work  []  No reason given  []  Other:  Pt arrived for therapy, completed his registration and began to feel cold, clammy, sweating. States he is diabetic and was likely having low blood sugar. Offered pt a piece of candy and water. Pt rescheduled PT evaluation.      Comments:      Electronically signed by:  Derrell Ochoa 11/3/2020, 3:32 PM

## 2020-11-18 NOTE — PLAN OF CARE
Outpatient Physical Therapy           [place]           [x] Phone: [phone]   Fax: [phone]  [facility]           [] Phone: [phone]   Fax: [phone]     To: Referring Practitioner: Chicho DEY    From: Beth Anthony PT     Patient: Ender Flores       : 1955  Diagnosis: Diagnosis: Fall, sequela   Treatment Diagnosis: Treatment Diagnosis: Abnormalities of gait and mobility, increased risk of falls, low back pain, hip pain   Date: 2020    Physical Therapy Certification/Re-Certification Form  Dear Nadia Thomas  The following patient has been evaluated for physical therapy services and for therapy to continue, insurance requires physician review of the treatment plan initially and every 90 days. Please review the attached evaluation and/or summary of the patient's plan of care, and verify that you agree therapy should continue by signing the attached document and sending it back to our office. Assessment:    Assessment: Steven Soto is a 72year old male who was referred to physical therapy after suffing a fall at home. Ruthann Aldridge presents with low back pain and increased R hip pain after the most recent fall. Objective findings include balance deficits, gait deficits, transfer deficits, and overall decreased mobility. Ruthann Aldridge completed the TUG in 31 seconds, indicating that he is at a higher risk for falls. Ruthann Aldridge would benefit from skilled therapeutic intervention in this setting to decrease pain, improve balance, and decrease risk of future falls.     Plan of Care/Treatment to date:  [x] Therapeutic Exercise  [x] Modalities:  [x] Therapeutic Activity     [] Ultrasound  [x] Electrical Stimulation  [x] Gait Training      [] Cervical Traction [] Lumbar Traction  [x] Neuromuscular Re-education    [x] Cold/hotpack [] Iontophoresis   [x] Instruction in HEP      [x] Vasopneumatic     [x] Manual Therapy               [] Aquatic Therapy       Other:   Address Low back and R hip pain        Frequency/Duration:  # Days per week: [] 1 day # Weeks: [] 1 week [] 5 weeks     [x] 2 days   [] 2 weeks [] 6 weeks     [] 3 days   [] 3 weeks [] 7 weeks     [] 4 days   [] 4 weeks [x] 8 weeks         [] 9 weeks [] 10 weeks         [] 11 weeks [] 12 weeks    Rehab Potential/Progress: [] Excellent [x] Good [] Fair  [] Poor     Goals:      Short term goals  Time Frame for Short term goals: 4 weeks  Short term goal 1: Patient will report compliance with HEP. Short term goal 2: Patient will present with improved score on the TUG from 31 seconds to 28 seconds to indicate improved balance and gait. Short term goal 3: Patient present with improved ability to complete 5 sit<>stand from  34 seconds to 28 seconds with bilateral upper extremity assist.  Short term goal 4: Patient will report a decrease in low back pain from 8/10 to 5/10 at rest.  Short term goal 5: Patient will present with improved R hip strength from 4/5 to 4+/5 . Long term goals  Time Frame for Long term goals : 8 weeks  Long term goal 1: Patient will be able to walk > 300ft without using an assistive device or any loss of balance. Long term goal 2: Patient will present with improved score on the TUG from 31 seconds to 26 seconds to indicate improved balance and gait. Long term goal 3: Patient will report a decrease in low back pain from 8/10 to 2/10 at rest.      Electronically signed by:  Mu Soriano PT, 11/18/2020, 5:29 PM        If you have any questions or concerns, please don't hesitate to call.   Thank you for your referral.      Physician Signature:________________________________Date:_________ TIME: _____  By signing above, therapists plan is approved by physician

## 2020-11-18 NOTE — FLOWSHEET NOTE
Outpatient Physical Therapy  [place]           [x] Phone: [phone]   Fax: [phone]  [facility]           [] Phone: [phone]   Fax: [phone]        Physical Therapy Daily Treatment Note  Date:  2020    Patient Name:  Donita Oliveira    :  1955  MRN: [id number]  Restrictions/Precautions:    Diagnosis:   Diagnosis: Fall, sequela  Date of Injury/Surgery:   Treatment Diagnosis: Treatment Diagnosis: Abnormalities of gait and mobility, increased risk of falls, low back pain, hip pain    Insurance/Certification information: PT Insurance Information: [place]- Based on precert   Referring Physician:  Referring Practitioner: Avinash DEY  Next Doctor Visit:    Plan of care signed (Y/N): pending    Outcome Measure: TUG 31 seconds  Visit# / total visits:  1 / (waiting on pre cert)  Pain level: 8/46 Low back  Goals:       Short term goals  Time Frame for Short term goals: 4 weeks  Short term goal 1: Patient will report compliance with HEP. Short term goal 2: Patient will present with improved score on the TUG from 31 seconds to 28 seconds to indicate improved balance and gait. Short term goal 3: Patient present with improved ability to complete 5 sit<>stand from  34 seconds to 28 seconds with bilateral upper extremity assist.  Short term goal 4: Patient will report a decrease in low back pain from 8/10 to 5/10 at rest.  Short term goal 5: Patient will present with improved R hip strength from 4/5 to 4+/5 . Long term goals  Time Frame for Long term goals : 8 weeks  Long term goal 1: Patient will be able to walk > 300ft without using an assistive device or any loss of balance. Long term goal 2: Patient will present with improved score on the TUG from 31 seconds to 26 seconds to indicate improved balance and gait.   Long term goal 3: Patient will report a decrease in low back pain from 8/10 to 2/10 at rest.    Summary of Evaluation: Assessment: Jolanta Rangel is a 72year old male who was with low back pain and increased R hip pain after the most recent fall. Objective findings include balance deficits, gait deficits, transfer deficits, and overall decreased mobility. Rodriguez Gutiérrez completed the TUG in 31 seconds, indicating that he is at a higher risk for falls. Rodriguez Gutiérrez would benefit from skilled therapeutic intervention in this setting to decrease pain, improve balance, and decrease risk of future falls. Plan for Next Session: Specific instructions for Next Treatment: Focus on balance/gait, also address low back and hip pain. Strengthening to lower extremities.       Time In / Time Out:     9163-9830    If [facility] Please Indicate Time In/Out  CPT Code Time in Time out                                                              Timed Code/Total Treatment Minutes:  52'/52'   Eval 32', Neuro 8', ADL 12'      Next Progress Note due:  10th visit      Plan of Care Interventions:  [x] Therapeutic Exercise  [x] Modalities:  [x] Therapeutic Activity     [] Ultrasound  [x] Estim  [x] Gait Training      [] Cervical Traction [] Lumbar Traction  [x] Neuromuscular Re-education    [x] Cold/hotpack [] Iontophoresis   [x] Instruction in HEP      [] Vasopneumatic   [] Dry Needling    [x] Manual Therapy               [] Aquatic Therapy              Electronically signed by:  Katie Laird, 11/18/2020, 5:31 PM

## 2020-11-18 NOTE — PROGRESS NOTES
nadya  Receives Help From: Home health(Help in the future)  ADL Assistance: Needs assistance  Homemaking Assistance: Needs assistance(Needs assistance with meals, cleaning, etc. Will be having a home health aid come to assist 3 days a week)  Active : No  Mode of Transportation: [address] [facility]: Patient typically walks only through his apartment. He does have transportation to help him go shopping to get groceries. Additional Comments: Patient reports he does not use the cane in the house but plans to start. Objective     Observation/Palpation  Palpation: Patient is donning a lumbar brace- unable to palpate lumbar structures. Assess next session. Observation: Patient ambulates with slow, antalgic gait pattern using a SPC upon entering the clinic. Patient with great difficulty transferring sit<>stand out of the chair. Ruthann Aldridge also did not have his tennis shoes tied and states that he doesn't have the physical ability to bend down and tie them. Strength RLE  Strength RLE: Exception  R Hip Flexion: 4/5(Increased pain)  R Knee Flexion: 4+/5  R Knee Extension: 4+/5  R Ankle Dorsiflexion: 4+/5  R Ankle Plantar flexion: 4+/5  Strength LLE  Strength LLE: Exception  L Hip Flexion: 4/5  L Knee Flexion: 4+/5  L Knee Extension: 4+/5  L Ankle Dorsiflexion: 4+/5  L Ankle Plantar Flexion: 4+/5     Additional Measures  Special Tests: TU seconds   Tandem stance:  20 seconds   Narrow stance: 30 seconds   Alternating step taps at 6 inch step:  x10 without holding hand rails  ,   Stance with eyes closed:  20 seconds, moderate sway  Sensation  Overall Sensation Status: Impaired(Sensation deficits in B feet associated with diabetic peripheral neuropathy)           Assessment   Conditions Requiring Skilled Therapeutic Intervention  Body structures, Functions, Activity limitations: Decreased ROM; Decreased strength;Decreased posture; Increased pain;Decreased endurance;Decreased balance  Assessment: Steven Soto is a 72year old male who was referred to physical therapy after suffing a fall at home. Shelley Campos presents with low back pain and increased R hip pain after the most recent fall. Objective findings include balance deficits, gait deficits, transfer deficits, and overall decreased mobility. Shelley Campos completed the TUG in 31 seconds, indicating that he is at a higher risk for falls. Shelley Campos would benefit from skilled therapeutic intervention in this setting to decrease pain, improve balance, and decrease risk of future falls. Treatment Diagnosis: Abnormalities of gait and mobility, increased risk of falls, low back pain, hip pain  Prognosis: Good  Decision Making: Medium Complexity  History: Pain in multiple body parts after suffering a fall  Activity Tolerance  Activity Tolerance: Patient Tolerated treatment well         Plan   Plan  Times per week: 2x  Plan weeks: 8 weeks  Specific instructions for Next Treatment: Focus on balance/gait, also address low back and hip pain. Strengthening to lower extremities. Current Treatment Recommendations: Strengthening, ROM, Balance Training, IADL Training, Gait Training, Transfer Training, Endurance Training, Pain Management      OutComes Score  TUG 31 seconds    Goals  Short term goals  Time Frame for Short term goals: 4 weeks  Short term goal 1: Patient will report compliance with HEP. Short term goal 2: Patient will present with improved score on the TUG from 31 seconds to 28 seconds to indicate improved balance and gait. Short term goal 3: Patient present with improved ability to complete 5 sit<>stand from  34 seconds to 28 seconds with bilateral upper extremity assist.  Short term goal 4: Patient will report a decrease in low back pain from 8/10 to 5/10 at rest.  Short term goal 5: Patient will present with improved R hip strength from 4/5 to 4+/5 .   Long term goals  Time Frame for Long term goals : 8 weeks  Long term goal 1: Patient will be able to walk > 300ft without using an assistive device or any loss of balance. Long term goal 2: Patient will present with improved score on the TUG from 31 seconds to 26 seconds to indicate improved balance and gait.   Long term goal 3: Patient will report a decrease in low back pain from 8/10 to 2/10 at rest.  Patient Goals   Patient goals : Improve balance, decrease falls, decrease pain in the back and hip       Therapy Time   Individual Concurrent Group Co-treatment   Time In 1523         Time Out 1615         Minutes [address], PT

## 2020-11-26 NOTE — ED NOTES
Pt states he is experiencing anxiety disorder, mild depression. Pt states he want to talk to the [facility]  stating they know  Why. Pt is complaining of muscle spasm and neck pain that's radiating down to his feet.      Amanda Lang RN  11/25/20 4537

## 2020-11-26 NOTE — ED PROVIDER NOTES
Emergency [address] EMERGENCY DEPARTMENT    Patient: Chet Plascencia  MRN: [id number]  : 1955  Date of Evaluation: 2020  ED Provider: Elsy Saenz PA-C    Chief Complaint       Chief Complaint   Patient presents with    Mental Health Problem     anxiety and little mild depression. Andriy Collazo is a 72 y.o. male who presents to the emergency department for \"mild\" depression, anxiety, and generalized pain. Patient is difficult to assess--starts by talking about his discharge from the [employer] during  and then about his daughter's death when she was 12years old. He states he has PTSD as a result. He tells me he has \"mild\" depression but is not suicidal or homicidal.  He has been feeling anxious because they cut back his pain medication so now he has increasing aches/pains and has difficulty sleeping because he can't get comfortable. He states he has to go outside and smoke when he is feeling anxious. He also tried drinking a mudslide today to help calm him down because a friend told him it might help. Just wants to talk to someone but wants to go home tonight to care for his cat. ROS     CONSTITUTIONAL:  Denies fever. EYES:  Denies visual changes. HEAD:  Denies headache. ENT:  Denies earache, nasal congestion, sore throat. NECK:  Denies neck pain. RESPIRATORY:  Denies any shortness of breath. CARDIOVASCULAR:  Denies chest pain. GI:  Denies nausea or vomiting. :  Denies urinary symptoms. MUSCULOSKELETAL:  + generalized pain. BACK:  Denies back pain. INTEGUMENT:  Denies skin changes. LYMPHATIC:  Denies lymphadenopathy. NEUROLOGIC:  Denies any numbness/tingling. PSYCHIATRIC:  Denies SI/HI. + anxiety, depression.     Past History     Past Medical History:   Diagnosis Date    Arthritis     [facility]     Chronic airway obstruction, not elsewhere classified 10/28/2013    [facility] major depressive disorder, recurrent episode [facility]     Diabetes mellitus ([facility]     GERD (gastroesophageal reflux disease)     Hepatitis C     Hypertension     Low back pain     Post traumatic stress disorder     Schizo affective schizophrenia ([facility]     per old chart pt in ER 7/28/2017- aggressive behavior at [facility]- and in ER- transferred to [address]) paranoid     No past surgical history on file. Social History     Socioeconomic History    Marital status:       Spouse name: Not on file    Number of children: Not on file    Years of education: Not on file    Highest education level: Not on file   Occupational History    Not on file   Social Needs    Financial resource strain: Not on file    Food insecurity     Worry: Not on file     Inability: Not on file    Transportation needs     Medical: Not on file     Non-medical: Not on file   Tobacco Use    Smoking status: Current Every Day Smoker     Packs/day: 1.00     Types: Cigarettes    Smokeless tobacco: Never Used   Substance and Sexual Activity    Alcohol use: No    Drug use: No    Sexual activity: Not Currently   Lifestyle    Physical activity     Days per week: Not on file     Minutes per session: Not on file    Stress: Not on file   Relationships    Social connections     Talks on phone: Not on file     Gets together: Not on file     Attends Jainism service: Not on file     Active member of club or organization: Not on file     Attends meetings of clubs or organizations: Not on file     Relationship status: Not on file    Intimate partner violence     Fear of current or ex partner: Not on file     Emotionally abused: Not on file     Physically abused: Not on file     Forced sexual activity: Not on file   Other Topics Concern    Not on file   Social History Narrative    Not on file       Medications/Allergies     Previous Medications    ALBUTEROL SULFATE HFA (PROAIR HFA) 108 (90 BASE) MCG/ACT INHALER Inhale 2 puffs into the lungs every 6 hours as needed for Wheezing    ARIPIPRAZOLE (ABILIFY) 20 MG TABLET    Take 1 tablet by mouth daily    ATORVASTATIN (LIPITOR) 10 MG TABLET    Take 1 tablet by mouth daily    BACLOFEN (LIORESAL) 20 MG TABLET    Take 20 mg by mouth 3 times daily    BLOOD PRESSURE KIT    1 kit by Does not apply route daily    CALCITRIOL (ROCALTROL) 0.25 MCG CAPSULE    Take 1 capsule by mouth daily Indications: supplement    ETODOLAC (LODINE) 500 MG TABLET    Take 1 tablet by mouth 2 times daily    FERROUS SULFATE (IRON) 325 (65 FE) MG TABS    TAKE ONE (1) TABLET BY MOUTH DAILY    HYDROXYCHLOROQUINE (PLAQUENIL) 200 MG TABLET        INSULIN ASPART (NOVOLOG) 100 UNIT/ML INJECTION VIAL    Inject 30 Units into the skin 3 times daily (before meals)    INSULIN GLARGINE (LANTUS) 100 UNIT/ML INJECTION VIAL    Inject 85 Units into the skin 2 times daily    INSULIN SYRINGE 1CC/29G ([employer] INS SYRINGE 1CC/29G) 29G X 1/2\" 1 ML MISC    1 each by Does not apply route daily    LISINOPRIL (PRINIVIL;ZESTRIL) 5 MG TABLET    Take 1 tablet by mouth daily    MELATONIN 10 MG TABS    Take 1 tablet by mouth nightly    METHADONE (DOLOPHINE) 5 MG TABLET    Take 5 mg by mouth every 12 hours as needed for Pain. MULTIPLE VITAMIN (DAILY WILLEM) TABS    Take 1 tablet by mouth daily Indications: supplement    NARCAN 4 MG/0.1ML LIQD NASAL SPRAY        OXYCODONE HCL (OXY-IR) 10 MG IMMEDIATE RELEASE TABLET        POLYVINYL ALCOHOL-POVIDONE (REFRESH OP)    Apply to eye    PRAZOSIN (MINIPRESS) 5 MG CAPSULE    Take 2 capsules by mouth nightly    PREGABALIN (LYRICA) 100 MG CAPSULE    Take 100 mg by mouth 3 times daily.      Allergies   Allergen Reactions    Latex     Acetaminophen      endstage liver disease    Bee Venom     Haldol [Haloperidol Lactate]     Nsaids      End stage liver disease    Nuts [Peanut-Containing Drug Products]     Other Swelling     insects        Physical Exam       ED Triage Vitals [11/25/20 2254]   BP Temp Temp Source Pulse Resp SpO2 Height Weight   (!) 142/82 98.6 °F (37 °C) Oral 88 20 98 % 5' 5.5\" (1.664 m) 212 lb (96.2 kg)     GENERAL APPEARANCE:  Well-developed, well-nourished, no acute distress. HEAD:  NC/AT. EYES:  Sclera anicteric. ENT:  Ears, nose, mouth normal.     NECK:  Supple. CARDIO:  RRR. LUNGS:   CTAB. Respirations unlabored. ABDOMEN:  Soft, non-distended, non-tender. BS active. EXTREMITIES:  No acute deformities. SKIN:  Warm and dry. NEUROLOGICAL:  Alert and oriented. PSYCHIATRIC:  Normal mood. Diagnostics     Labs:  Results for orders placed or performed during the hospital encounter of 11/25/20   Ethanol   Result Value Ref Range    Alcohol Scrn <0.01 <7.85 %WT/VOL   Salicylate   Result Value Ref Range    Salicylate Lvl 0.2 (L) 15 - 30 MG/DL    DOSE AMOUNT DOSE AMT. GIVEN - Unknown     DOSE TIME DOSE TIME GIVEN - Unknown    Acetaminophen Level   Result Value Ref Range    Acetaminophen Level <5.0 (L) 15 - 30 ug/ml    DOSE AMOUNT DOSE AMT.  GIVEN - UNKNOWN     DOSE TIME DOSE TIME GIVEN - UNKNOWN    CBC Auto Differential   Result Value Ref Range    WBC 9.8 4.0 - 10.5 K/CU MM    RBC 5.39 4.6 - 6.2 M/CU MM    Hemoglobin 15.9 13.5 - 18.0 GM/DL    Hematocrit 46.3 42 - 52 %    MCV 85.9 78 - 100 FL    MCH 29.5 27 - 31 PG    MCHC 34.3 32.0 - 36.0 %    RDW 13.6 11.7 - 14.9 %    Platelets 412 (L) 590 - 440 K/CU MM    MPV 9.4 7.5 - 11.1 FL    Differential Type AUTOMATED DIFFERENTIAL     Segs Relative 72.3 (H) 36 - 66 %    Lymphocytes % 18.2 (L) 24 - 44 %    Monocytes % 8.0 (H) 0 - 4 %    Eosinophils % 0.6 0 - 3 %    Basophils % 0.5 0 - 1 %    Segs Absolute 7.1 K/CU MM    Lymphocytes Absolute 1.8 K/CU MM    Monocytes Absolute 0.8 K/CU MM    Eosinophils Absolute 0.1 K/CU MM    Basophils Absolute 0.1 K/CU MM    Nucleated RBC % 0.0 %    Total Nucleated RBC 0.0 K/CU MM    Total Immature Neutrophil 0.04 K/CU MM    Immature Neutrophil % 0.4 0 - 0.43 %   Comprehensive Metabolic Panel w/ Reflex to MG   Result Value Ref Range    Sodium 132 (L) 135 - 145 MMOL/L    Potassium 4.3 3.5 - 5.1 MMOL/L    Chloride 96 (L) 99 - 110 mMol/L    CO2 22 21 - 32 MMOL/L    BUN 13 6 - 23 MG/DL    CREATININE 0.6 (L) 0.9 - 1.3 MG/DL    Glucose 289 (H) 70 - 99 MG/DL    Calcium 8.9 8.3 - 10.6 MG/DL    Alb 4.2 3.4 - 5.0 GM/DL    Total Protein 7.8 6.4 - 8.2 GM/DL    Total Bilirubin 0.9 0.0 - 1.0 MG/DL    ALT 48 (H) 10 - 40 U/L    AST 76 (H) 15 - 37 IU/L    Alkaline Phosphatase 76 40 - 129 IU/L    GFR Non-African American >60 >60 mL/min/1.73m2    GFR African American >60 >60 mL/min/1.73m2    Anion Gap 14 4 - 16       ED Course and MDM   -  Patient seen and evaluated in the emergency department. -  Triage and nursing notes reviewed and incorporated. -  Old chart records reviewed and incorporated. -  Supervising physician was Dr. Isiah Macdonald. Patient was seen independently. -  Patient with complaints of anxiety, \"mild depression\" and MSK pain. I do believe he is most upset about his pain medication dosing being cut. He denies SI/HI. Lively, laughing and talking with staff. Labs were initiated but then patient was requesting to leave so he can get back home to his cat. He is not suicidal or homicidal, I do not think he is a potential threat. Will dc home, FU with PCP, return here as needed. He is agreeable with plan of care and disposition.  -  Disposition:  Home    In light of current events, I did utilize appropriate PPE (including N95 and surgical face mask, safety glasses, and gloves, as recommended by the health facility/national standard best practice, during my bedside interactions with the patient. Final Impression      1. Anxiety state    2.  Musculoskeletal pain          DISPOSITION Decision To Discharge 11/26/2020 01:00:15 AM      Sabrina Swanson PA-C  [address] [place] [address]  11/26/20 2059

## 2020-11-27 NOTE — ED PROVIDER NOTES
signs, No masses, normal bowel sounds    Back:  No midline point tenderness, No paraspinous muscle tenderness. No CVA tenderness    Extremities:  No gross deformities, no edema, no tenderness    Neurologic:  Normal motor function, Normal sensory function, No focal deficits    Skin:  Warm, Dry, No erythema, No rash, No cyanosis, No mottling    Lymphatic:  No lymphadenopathy in the following location(s): cervical    Psychiatric:  Alert and oriented x3, Affect normal          RADIOLOGY/PROCEDURES/LABS/MEDICATIONS ADMINISTERED:    I have reviewed and interpreted all of the currently available lab results from this visit (if applicable):  No results found for this visit on 11/26/20. ABNORMAL LABS:  Labs Reviewed - No data to display      IMAGING STUDIES ORDERED:  None      No orders to display         MEDICATIONS ADMINISTERED:  Medications - No data to display      PLAN/ED COURSE:  Last Vitals: BP (!) 176/94   Pulse 110   Temp 97.9 °F (36.6 °C) (Oral)   Resp 19   Ht 5' 5.5\" (1.664 m)   Wt 212 lb (96.2 kg)   SpO2 96%   BMI 34.74 kg/m²     60-year-old male with history of polysubstance abuse. Brought in by police due to wandering outside this evening. He states he was trying to meet with his crack dealer. He answers all questions appropriately. Denies suicidal or homicidal ideations. Does not exhibit any evidence to suggest acute psychosis. Denies any medical symptoms or complaints or concerns. States he does not wish to be here in the emergency department and would like to be discharged. Clinical Impression:  1.  Polysubstance abuse ([facility] [address]        Disposition referral (if applicable):  CAROLANN Michael NP  [address] [facility] [address]  [phone]    Schedule an appointment as soon as possible for a visit       [address] [place] [address] Emergency Department  [place] [id number] [phone]    If symptoms worsen      Disposition medications (if applicable):  Discharge Medication List as of 11/27/2020 12:50 AM          ED Provider Disposition Time  DISPOSITION Decision To Discharge 11/27/2020 12:02:18 AM            Electronically signed by Freida Mcardle, MD on 11/28/2020 at 4:54 AM        Freida Mcardle, MD  11/28/20 8549

## 2020-11-27 NOTE — ED NOTES
Bed: ED-28  Expected date:   Expected time:   Means of arrival:   Comments:  ems     Orlin Brady  11/26/20 6588

## 2020-11-28 NOTE — ED NOTES
Bed: ED-25  Expected date:   Expected time:   Means of arrival:   Comments:  EMS      Breezy Beebe RN  11/27/20 4723

## 2020-11-28 NOTE — ED NOTES
Patient medically cleared.  Awaiting [facility] eval.      Madonna Glez RN  11/28/20 1623

## 2020-11-28 NOTE — ED NOTES
Report received from Johnna Scheuermann RN sitter remaining at the bedside for 1:1 observation protocol. VS achieved at this time.      Laura Roca RN  11/28/20 6726

## 2020-11-28 NOTE — ED PROVIDER NOTES
CARE RECEIVED FROM:       I reviewed the key elements of the history, physical exam and initial treatment plan at the bedside. ANCILLARY DATA:  I reviewed the images. Radiologist interpretation:   CT HEAD WO CONTRAST   Final Result   No acute intracranial abnormality. Labs Reviewed   CBC WITH AUTO DIFFERENTIAL - Abnormal; Notable for the following components:       Result Value    WBC 13.3 (*)     Platelets 991 (*)     Segs Relative 85.5 (*)     Lymphocytes % 7.9 (*)     Monocytes % 5.6 (*)     Immature Neutrophil % 0.8 (*)     All other components within normal limits   COMPREHENSIVE METABOLIC PANEL - Abnormal; Notable for the following components:    Sodium 133 (*)     Chloride 94 (*)     CREATININE 0.6 (*)     Glucose 339 (*)     Total Bilirubin 1.2 (*)     ALT 73 (*)      (*)     Anion Gap 17 (*)     All other components within normal limits   SALICYLATE LEVEL - Abnormal; Notable for the following components:    Salicylate Lvl <9.4 (*)     All other components within normal limits   ACETAMINOPHEN LEVEL - Abnormal; Notable for the following components:    Acetaminophen Level <5.0 (*)     All other components within normal limits   ETHANOL   URINE DRUG SCREEN   URINALYSIS       MEDICAL DECISION MAKING / PLAN:      Patient awaiting mental health evaluation. Patient signed out to the day attending. Clinical Impression:  1. Paranoid delusion ([address]    2. Hyperglycemia        Disposition referral (if applicable):  No follow-up provider specified. Disposition medications (if applicable):  New Prescriptions    No medications on file       ED Provider Disposition Time  DISPOSITION            Electronically signed by: Cheyenne Loera M.D., 11/27/2020 11:51 PM      This dictation was created with voice recognition software.  While attempts have been made to review the dictation as it is transcribed, on occasion the spoken word can be misinterpreted by the technology leading to omissions or inappropriate words, phrases or sentences.         Lianne Stovall MD  11/28/20 1283

## 2020-11-28 NOTE — ED NOTES
Patient in bed, resting at this time. Room broken down. Sitter at bedside.       Sabra Cantu RN  11/28/20 6291

## 2020-11-28 NOTE — ED NOTES
Pt continues to be extremely confused and unable to express himself due to \"not remembering\"; pt gets upset with himself when he cannot keep his things and when he realizes he is stuck in the ER and unable to leave due to pink slip.      Laura Roca RN  11/28/20 0042

## 2020-11-28 NOTE — ED NOTES
Security notified that belongings need collected. Pt requesting nicotine patch and sisters phone number from his phone. Security states that they will come get belongings. Assessment not completed. Assessment completed at this time by this RN, pt is alert and oriented and medically cleared and ready for mental health evaluation.       Triny Currie, RN  11/28/20 [address], RN  11/28/20 6134

## 2020-11-28 NOTE — ED NOTES
Report given to Eual Rubinstein RN to assume care at this time.       Jean Pierre Hall RN  11/28/20 9114

## 2020-11-28 NOTE — ED NOTES
No changes in assessment . Tonia Oconnell Resting quietly. Sitter at bedside.      Esme Jones RN  11/28/20 7522

## 2020-11-28 NOTE — ED NOTES
Report received from [address] [place] [address]. Patient resting in bed. Sitter at bedside.       Hayes Curling, RN  11/28/20 8283

## 2020-11-28 NOTE — ED PROVIDER NOTES
Triage Chief Complaint:   Altered Mental Status    [place]:  Nia Cross is a 72 y.o. male that presents after being found wandering around his street neighborhood. Patient was brought in by EMS. EMS reports that he is perseverating about \"[employer] making him do it\". Patient on arrival expresses significant paranoid thoughts regarding people stealing his cell phone apps and plugging in \"XX but not like the sexual kind\" and that the [employer] knows about it\". Patient reports Lety Olivarez are going up on my medicines but not know what I take\". Patient does admit to some substances of abuse. Patient reports \"they tell me I am schizophrenic but I am not\". No suicidal or homicidal ideation. No visual hallucinations. No somatic complaints at this time other than his chronic pain issues for which he is on methadone.     ROS:  General:  No fevers  Eyes:  No recent vison changes  ENT:  No sore throat, no nasal congestion  Cardiovascular:  No chest pain, no palpitations  Respiratory:  No shortness of breath  Gastrointestinal:  No pain, no nausea, no vomiting, no diarrhea  Musculoskeletal:  No muscle pain, + chronic pain  Skin:  No rash  Neurologic:  no headache  Psychiatric:  No anxiety, + depression, + paranoid  Genitourinary:  No dysuria  Endocrine:  No unexpected weight gain, no unexpected weight loss  Extremities:  no edema, no pain    Past Medical History:   Diagnosis Date    Arthritis     Bipolar 1 disorder (HCC)     Chronic airway obstruction, not elsewhere classified 10/28/2013    Chronic major depressive disorder, recurrent episode ([address]     Diabetes mellitus (HCC)     GERD (gastroesophageal reflux disease)     Hepatitis C     Hypertension     Low back pain     Post traumatic stress disorder     Schizo affective schizophrenia ([address]     per old chart pt in ER 7/28/2017- [facility] behavior at [facility]- and in ER- transferred to [facility]    Schizophrenia [facility]) paranoid     History reviewed. No pertinent surgical history. Family History   Problem Relation Age of Onset    Diabetes Mother     Cancer Mother     Diabetes Father     Cancer Father     Cancer Brother      Social History     Socioeconomic History    Marital status:       Spouse name: Not on file    Number of children: Not on file    Years of education: Not on file    Highest education level: Not on file   Occupational History    Not on file   Social Needs    Financial resource strain: Not on file    Food insecurity     Worry: Not on file     Inability: Not on file    Transportation needs     Medical: Not on file     Non-medical: Not on file   Tobacco Use    Smoking status: Current Every Day Smoker     Packs/day: 1.00     Types: Cigarettes    Smokeless tobacco: Never Used   Substance and Sexual Activity    Alcohol use: No    Drug use: No    Sexual activity: Not Currently   Lifestyle    Physical activity     Days per week: Not on file     Minutes per session: Not on file    Stress: Not on file   Relationships    Social connections     Talks on phone: Not on file     Gets together: Not on file     Attends Religion service: Not on file     Active member of club or organization: Not on file     Attends meetings of clubs or organizations: Not on file     Relationship status: Not on file    Intimate partner violence     Fear of current or ex partner: Not on file     Emotionally abused: Not on file     Physically abused: Not on file     Forced sexual activity: Not on file   Other Topics Concern    Not on file   Social History Narrative    Not on file     Current Facility-Administered Medications   Medication Dose Route Frequency Provider Last Rate Last Dose    0.9 % sodium chloride bolus  1,000 mL Intravenous Once Zoey Kraft  mL/hr at 11/27/20 2334 1,000 mL at 11/27/20 2334     Current Outpatient Medications   Medication Sig Dispense Refill    Ferrous Sulfate (IRON) 325 (65 Fe) MG TABS TAKE ONE (1) TABLET BY MOUTH DAILY 90 tablet 0    prazosin (MINIPRESS) 5 MG capsule Take 2 capsules by mouth nightly 180 capsule 0    hydroxychloroquine (PLAQUENIL) 200 MG tablet       oxyCODONE HCl (OXY-IR) 10 MG immediate release tablet       NARCAN 4 MG/0.1ML LIQD nasal spray       ARIPiprazole (ABILIFY) 20 MG tablet Take 1 tablet by mouth daily 90 tablet 0    atorvastatin (LIPITOR) 10 MG tablet Take 1 tablet by mouth daily 90 tablet 0    calcitRIOL (ROCALTROL) 0.25 MCG capsule Take 1 capsule by mouth daily Indications: supplement 90 capsule 0    INSULIN SYRINGE 1CC/29G ([employer] INS SYRINGE 1CC/29G) 29G X 1/2\" 1 ML MISC 1 each by Does not apply route daily 200 each 5    lisinopril (PRINIVIL;ZESTRIL) 5 MG tablet Take 1 tablet by mouth daily 90 tablet 0    Melatonin 10 MG TABS Take 1 tablet by mouth nightly 90 tablet 0    insulin aspart (NOVOLOG) 100 UNIT/ML injection vial Inject 30 Units into the skin 3 times daily (before meals) 3 vial 5    insulin glargine (LANTUS) 100 UNIT/ML injection vial Inject 85 Units into the skin 2 times daily 5 vial 5    Multiple Vitamin (DAILY WILLEM) TABS Take 1 tablet by mouth daily Indications: supplement 90 tablet 0    etodolac (LODINE) 500 MG tablet Take 1 tablet by mouth 2 times daily 60 tablet 0    Blood Pressure KIT 1 kit by Does not apply route daily 1 kit 0    albuterol sulfate HFA (PROAIR HFA) 108 (90 Base) MCG/ACT inhaler Inhale 2 puffs into the lungs every 6 hours as needed for Wheezing      Polyvinyl Alcohol-Povidone (REFRESH OP) Apply to eye      methadone (DOLOPHINE) 5 MG tablet Take 5 mg by mouth every 12 hours as needed for Pain.  baclofen (LIORESAL) 20 MG tablet Take 20 mg by mouth 3 times daily      pregabalin (LYRICA) 100 MG capsule Take 100 mg by mouth 3 times daily.        Allergies   Allergen Reactions    Latex     Acetaminophen      endstage liver disease    Bee Venom     Haldol [Haloperidol Lactate]     Nsaids      End stage liver disease    Nuts [Peanut-Containing Drug Products]     Other Swelling     insects       Nursing Notes Reviewed    Physical Exam:  ED Triage Vitals [11/27/20 2178]   Enc Vitals Group      BP (!) 153/80      Pulse 119      Resp 18      Temp 98.9 °F (37.2 °C)      Temp Source Oral      SpO2 96 %      Weight 210 lb (95.3 kg)      Height 5' 7\" (1.702 m)      Head Circumference       Peak Flow       Pain Score       Pain Loc       Pain Edu? Excl. in [address]? General appearance:  No acute distress. Appears somewhat deconditioned but overall nontoxic. Skin:  Warm. Dry. No diaphoresis. No rash to exposed skin. Eye:  Extraocular movements intact. Pupils equal round react to light. No nystagmus. Ears, nose, mouth and throat:  Oral mucosa moist.  No cephalhematoma, vargas sign or raccoon eyes. Midface is stable. Neck:  Trachea midline. No bony midline cervical tenderness to palpation. Extremity: Normal ROM. Extremities are nontender. Heart:  Regular  Perfusion:  Intact   Respiratory:   Respirations nonlabored. Speaking clearly in full sentences. Abdominal:  Non distended. Neurological:  Alert and oriented times 3. No focal neuro deficits.              Psychiatric:  + excitable, + paranoid, + delusional, NO depression, NO suicidal ideations, no homicidal ideations, + bizarre thoughts, good eye contact    I have reviewed and interpreted all of the currently available lab results from this visit (if applicable):  Results for orders placed or performed during the hospital encounter of 11/27/20   CBC auto diff   Result Value Ref Range    WBC 13.3 (H) 4.0 - 10.5 K/CU MM    RBC 5.13 4.6 - 6.2 M/CU MM    Hemoglobin 14.8 13.5 - 18.0 GM/DL    Hematocrit 44.3 42 - 52 %    MCV 86.4 78 - 100 FL    MCH 28.8 27 - 31 PG    MCHC 33.4 32.0 - 36.0 %    RDW 13.5 11.7 - 14.9 %    Platelets 101 (L) 236 - 440 K/CU MM    MPV 8.9 7.5 - 11.1 FL    Differential Type AUTOMATED DIFFERENTIAL     Segs Relative 85.5 (H) 36 - 66 %    Lymphocytes % 7.9 (L) 24 - 44 %    Monocytes % 5.6 (H) 0 - 4 %    Eosinophils % 0.0 0 - 3 %    Basophils % 0.2 0 - 1 %    Segs Absolute 11.4 K/CU MM    Lymphocytes Absolute 1.1 K/CU MM    Monocytes Absolute 0.8 K/CU MM    Eosinophils Absolute 0.0 K/CU MM    Basophils Absolute 0.0 K/CU MM    Nucleated RBC % 0.0 %    Total Nucleated RBC 0.0 K/CU MM    Total Immature Neutrophil 0.11 K/CU MM    Immature Neutrophil % 0.8 (H) 0 - 0.43 %   CMP   Result Value Ref Range    Sodium 133 (L) 135 - 145 MMOL/L    Potassium 3.9 3.5 - 5.1 MMOL/L    Chloride 94 (L) 99 - 110 mMol/L    CO2 22 21 - 32 MMOL/L    BUN 20 6 - 23 MG/DL    CREATININE 0.6 (L) 0.9 - 1.3 MG/DL    Glucose 339 (H) 70 - 99 MG/DL    Calcium 8.6 8.3 - 10.6 MG/DL    Alb 4.1 3.4 - 5.0 GM/DL    Total Protein 7.5 6.4 - 8.2 GM/DL    Total Bilirubin 1.2 (H) 0.0 - 1.0 MG/DL    ALT 73 (H) 10 - 40 U/L     (H) 15 - 37 IU/L    Alkaline Phosphatase 77 40 - 129 IU/L    GFR Non-African American >60 >60 mL/min/1.73m2    GFR African American >60 >60 mL/min/1.73m2    Anion Gap 17 (H) 4 - 16   ETOH Blood   Result Value Ref Range    Alcohol Scrn <0.01 <1.64 %WT/VOL   Salicylate Level   Result Value Ref Range    Salicylate Lvl <5.7 (L) 15 - 30 MG/DL    DOSE AMOUNT DOSE AMT. GIVEN - UNKNOWN     DOSE TIME DOSE TIME GIVEN - UNKNOWN    Acetaminophen Level   Result Value Ref Range    Acetaminophen Level <5.0 (L) 15 - 30 ug/ml    DOSE AMOUNT DOSE AMT. GIVEN - UNKNOWN     DOSE TIME DOSE TIME GIVEN - UNKNOWN       Radiographs (if obtained):  [] The following radiograph was interpreted by myself in the absence of a radiologist:   [x] Radiologist's Report Reviewed:  CT HEAD WO CONTRAST   Final Result   No acute intracranial abnormality. EKG (if obtained): (All EKG's are interpreted by myself in the absence of a cardiologist)    Chart review shows recent radiographs:  No results found. MDM:  Patient presenting for depression as well as thoughts of suicide.   The patient was placed in suicide precautions, patient's clothing and belongings were removed, documented and stored in the emergency department. Patient's workup was initiated lab results as above. CT head is negative for acute intracranial process. Acetaminophen salicylate levels are negative. EtOH negative. CBC is with small leukocytosis. CMP is with hyperglycemia without acidosis and mild hyponatremia as well as a transaminitis. IV fluids are given for patient's hyperglycemia and tachycardia. Patient is pink slipped by myself. Urine drug screen is pending as well as mental health assessment and final disposition of patient on signout to overnight physician, Dr. Chicho Licea. Clinical Impression:  1. Paranoid delusion ([facility]    2. Hyperglycemia      Disposition referral (if applicable):  No follow-up provider specified. Disposition medications (if applicable):  New Prescriptions    No medications on file       Comment: Please note this report has been produced using speech recognition software and may contain errors related to that system including errors in grammar, punctuation, and spelling, as well as words and phrases that may be inappropriate. If there are any questions or concerns please feel free to contact the dictating provider for clarification.        Yesy Crain MD  11/27/20 8698

## 2020-11-28 NOTE — ED NOTES
Spoke with daughter, Kerrie Koo, at this time. She used to be pts POA and pt took her too court for this and she is no longer poa. Kerrie Koo states he is very high functioning and has a number of mental health problems but he completely takes care of himself so he has been living on his own for the last 10 months and she has no idea if he has been taking his medications properly. Pt will need to be admitted elsewhere to get this under control.      Mariel Coto RN  11/28/20 3729

## 2020-11-28 NOTE — ED PROVIDER NOTES
72 yom presents for psychiatric evaluation. He was seen by previous physician and signed out to me. He is currently pending mental health evaluation. He has been deemed medically clear. He does have a history of diabetes and is blood sugar is elevated at 339. His CO2 is unremarkable on his CMP. Labs not consistent with DKA. I will administer some insulin. He is calm and cooperative at this time. Final disposition will be pending mental evaluation. Patient was evaluated and recommendation was for admission. He will be transferred to [facility]. He is transferred in stable condition. He is calm and cooperative at this time.     Marielle Jasso  11/28/2020   3:13 PM       Marielle Jasso MD  11/28/20 7005

## 2020-11-28 NOTE — ED NOTES
Talked with Lety from [facility] - updated on pt care needs, told pt is continent of bladder and stool, pt wears glasses, pt is ambulatory without any assistance, is independent.       Ana Zapien RN  11/28/20 1872

## 2020-11-28 NOTE — ED NOTES
Pt is stating it is OK for us to talk to his sister Stacy Daly and give her information.       Iza Anthony  11/28/20 7191

## 2020-11-28 NOTE — ED NOTES
Pt given phone to contact his sister and brother in law. Pt tearful and confused. Pt is to be seen by Mental Health today and is pink slipped until either discharged or admission.       Sherryle Krill, RN  11/28/20 0382

## 2020-11-28 NOTE — ED NOTES
Patient resting. Sitter at bedside. Requesting food, will ask Dr Griffin Genao.      Dana Gross, RN  11/28/20 0138

## 2020-12-01 NOTE — FLOWSHEET NOTE
Physical Therapy  Cancellation/No-show Note  Patient Name:  Blaine Alonso  :  1955   Date:  2020  Cancelled visits to date: 1  No-shows to date: 2    For today's appointment patient:  []  Cancelled  []  Rescheduled appointment  []  No-show     Reason given by patient:  []  Patient ill  []  Conflicting appointment  []  No transportation    []  Conflict with work  [x]  No reason given  []  Other:     Comments:  [place] [facility] [place], [place]. Spoke with sister Sandra Farley (emergency contact), we decided to cancel 12/3 appointment. Loyce Holter that we will keep his chart open for 30 days and he may return when he is ready.     Electronically signed by:  Danika Rolon PTA           2020, 8:37 AM

## 2021-01-01 ENCOUNTER — APPOINTMENT (OUTPATIENT)
Dept: CT IMAGING | Age: 66
End: 2021-01-01
Payer: MEDICARE

## 2021-01-01 ENCOUNTER — APPOINTMENT (OUTPATIENT)
Dept: CT IMAGING | Age: 66
DRG: 547 | End: 2021-01-01
Payer: MEDICARE

## 2021-01-01 ENCOUNTER — TELEPHONE (OUTPATIENT)
Dept: FAMILY MEDICINE CLINIC | Age: 66
End: 2021-01-01

## 2021-01-01 ENCOUNTER — CARE COORDINATION (OUTPATIENT)
Dept: CARE COORDINATION | Age: 66
End: 2021-01-01

## 2021-01-01 ENCOUNTER — OFFICE VISIT (OUTPATIENT)
Dept: ONCOLOGY | Age: 66
End: 2021-01-01
Payer: MEDICARE

## 2021-01-01 ENCOUNTER — HOSPITAL ENCOUNTER (INPATIENT)
Age: 66
LOS: 2 days | Discharge: HOME HEALTH | DRG: 547 | End: 2021-04-29
Attending: INTERNAL MEDICINE | Admitting: INTERNAL MEDICINE
Payer: MEDICARE

## 2021-01-01 ENCOUNTER — TELEPHONE (OUTPATIENT)
Dept: ONCOLOGY | Age: 66
End: 2021-01-01

## 2021-01-01 ENCOUNTER — HOSPITAL ENCOUNTER (OUTPATIENT)
Dept: INFUSION THERAPY | Age: 66
Discharge: HOME | End: 2021-03-31
Payer: MEDICARE

## 2021-01-01 ENCOUNTER — APPOINTMENT (OUTPATIENT)
Dept: GENERAL RADIOLOGY | Age: 66
DRG: 547 | End: 2021-01-01
Payer: MEDICARE

## 2021-01-01 ENCOUNTER — HOSPITAL ENCOUNTER (OUTPATIENT)
Age: 66
Discharge: HOME | End: 2021-03-31
Payer: MEDICARE

## 2021-01-01 ENCOUNTER — HOSPITAL ENCOUNTER (EMERGENCY)
Age: 66
Discharge: HOME | End: 2021-06-14
Attending: EMERGENCY MEDICINE
Payer: MEDICARE

## 2021-01-01 VITALS
SYSTOLIC BLOOD PRESSURE: 151 MMHG | OXYGEN SATURATION: 97 % | HEART RATE: 105 BPM | BODY MASS INDEX: 30.13 KG/M2 | HEIGHT: 67 IN | RESPIRATION RATE: 18 BRPM | TEMPERATURE: 99 F | WEIGHT: 192 LBS | DIASTOLIC BLOOD PRESSURE: 72 MMHG

## 2021-01-01 VITALS
TEMPERATURE: 98.7 F | OXYGEN SATURATION: 91 % | WEIGHT: 190 LBS | SYSTOLIC BLOOD PRESSURE: 122 MMHG | DIASTOLIC BLOOD PRESSURE: 69 MMHG | HEIGHT: 66 IN | HEART RATE: 94 BPM | RESPIRATION RATE: 16 BRPM | BODY MASS INDEX: 30.53 KG/M2

## 2021-01-01 VITALS
SYSTOLIC BLOOD PRESSURE: 108 MMHG | HEIGHT: 66 IN | RESPIRATION RATE: 18 BRPM | HEART RATE: 109 BPM | OXYGEN SATURATION: 92 % | TEMPERATURE: 99 F | DIASTOLIC BLOOD PRESSURE: 61 MMHG | BODY MASS INDEX: 30.53 KG/M2 | WEIGHT: 190 LBS

## 2021-01-01 DIAGNOSIS — L02.415 CUTANEOUS ABSCESS OF RIGHT LOWER EXTREMITY: Primary | ICD-10-CM

## 2021-01-01 DIAGNOSIS — D69.59 OTHER SECONDARY THROMBOCYTOPENIA: ICD-10-CM

## 2021-01-01 DIAGNOSIS — K52.9 COLITIS: Primary | ICD-10-CM

## 2021-01-01 DIAGNOSIS — R73.9 HYPERGLYCEMIA: ICD-10-CM

## 2021-01-01 DIAGNOSIS — E87.1 HYPONATREMIA: ICD-10-CM

## 2021-01-01 DIAGNOSIS — R10.84 GENERALIZED ABDOMINAL PAIN: ICD-10-CM

## 2021-01-01 DIAGNOSIS — M54.9 CHRONIC BACK PAIN, UNSPECIFIED BACK LOCATION, UNSPECIFIED BACK PAIN LATERALITY: ICD-10-CM

## 2021-01-01 DIAGNOSIS — D64.89 OTHER SPECIFIED ANEMIAS: ICD-10-CM

## 2021-01-01 DIAGNOSIS — Z79.4 TYPE 2 DIABETES MELLITUS WITH DIABETIC NEUROPATHY, WITH LONG-TERM CURRENT USE OF INSULIN (HCC): ICD-10-CM

## 2021-01-01 DIAGNOSIS — G89.29 CHRONIC BACK PAIN, UNSPECIFIED BACK LOCATION, UNSPECIFIED BACK PAIN LATERALITY: ICD-10-CM

## 2021-01-01 DIAGNOSIS — R53.83 FATIGUE, UNSPECIFIED TYPE: ICD-10-CM

## 2021-01-01 DIAGNOSIS — D64.89 OTHER SPECIFIED ANEMIAS: Primary | ICD-10-CM

## 2021-01-01 DIAGNOSIS — R10.9 ABDOMINAL PAIN, UNSPECIFIED ABDOMINAL LOCATION: ICD-10-CM

## 2021-01-01 DIAGNOSIS — Z78.9 DECREASED ACTIVITIES OF DAILY LIVING (ADL): ICD-10-CM

## 2021-01-01 DIAGNOSIS — E11.40 TYPE 2 DIABETES MELLITUS WITH DIABETIC NEUROPATHY, WITH LONG-TERM CURRENT USE OF INSULIN (HCC): ICD-10-CM

## 2021-01-01 LAB
ALBUMIN SERPL-MCNC: 3.9 GM/DL (ref 3.4–5)
ALBUMIN SERPL-MCNC: 4 GM/DL (ref 3.4–5)
ALBUMIN SERPL-MCNC: 4.1 GM/DL (ref 3.4–5)
ALBUMIN SERPL-MCNC: 4.2 GM/DL (ref 3.4–5)
ALBUMIN SERPL-MCNC: 4.3 GM/DL (ref 3.4–5)
ALP BLD-CCNC: 50 IU/L (ref 40–129)
ALP BLD-CCNC: 51 IU/L (ref 40–129)
ALP BLD-CCNC: 57 IU/L (ref 40–129)
ALT SERPL-CCNC: 20 U/L (ref 10–40)
ALT SERPL-CCNC: 22 U/L (ref 10–40)
ALT SERPL-CCNC: 23 U/L (ref 10–40)
ALT SERPL-CCNC: 26 U/L (ref 10–40)
AMPHETAMINES: NEGATIVE
ANION GAP SERPL CALCULATED.3IONS-SCNC: 10 MMOL/L (ref 4–16)
ANION GAP SERPL CALCULATED.3IONS-SCNC: 11 MMOL/L (ref 4–16)
ANION GAP SERPL CALCULATED.3IONS-SCNC: 12 MMOL/L (ref 4–16)
ANION GAP SERPL CALCULATED.3IONS-SCNC: 13 MMOL/L (ref 4–16)
ANION GAP SERPL CALCULATED.3IONS-SCNC: 6 MMOL/L (ref 4–16)
ANION GAP SERPL CALCULATED.3IONS-SCNC: 8 MMOL/L (ref 4–16)
ANTI DNA DOUBLE STRANDED: NORMAL
AST SERPL-CCNC: 21 IU/L (ref 15–37)
AST SERPL-CCNC: 21 IU/L (ref 15–37)
AST SERPL-CCNC: 23 IU/L (ref 15–37)
AST SERPL-CCNC: 24 IU/L (ref 15–37)
BACTERIA: NEGATIVE /HPF
BACTERIA: NEGATIVE /HPF
BARBITURATE SCREEN URINE: NEGATIVE
BASOPHILS ABSOLUTE: 0 K/CU MM
BASOPHILS ABSOLUTE: 0.1 K/CU MM
BASOPHILS RELATIVE PERCENT: 0.3 % (ref 0–1)
BASOPHILS RELATIVE PERCENT: 0.4 % (ref 0–1)
BASOPHILS RELATIVE PERCENT: 0.5 % (ref 0–1)
BASOPHILS RELATIVE PERCENT: 0.6 % (ref 0–1)
BENZODIAZEPINE SCREEN, URINE: NEGATIVE
BILIRUB SERPL-MCNC: 0.3 MG/DL (ref 0–1)
BILIRUB SERPL-MCNC: 0.5 MG/DL (ref 0–1)
BILIRUB SERPL-MCNC: 0.9 MG/DL (ref 0–1)
BILIRUBIN DIRECT: 0.3 MG/DL (ref 0–0.3)
BILIRUBIN URINE: NEGATIVE MG/DL
BILIRUBIN URINE: NEGATIVE MG/DL
BILIRUBIN, INDIRECT: 0.6 MG/DL (ref 0–0.7)
BLOOD, URINE: NEGATIVE
BLOOD, URINE: NEGATIVE
BUN BLDV-MCNC: 10 MG/DL (ref 6–23)
BUN BLDV-MCNC: 12 MG/DL (ref 6–23)
BUN BLDV-MCNC: 13 MG/DL (ref 6–23)
BUN BLDV-MCNC: 14 MG/DL (ref 6–23)
BUN BLDV-MCNC: 14 MG/DL (ref 6–23)
BUN BLDV-MCNC: 18 MG/DL (ref 6–23)
BUN BLDV-MCNC: 20 MG/DL (ref 6–23)
CALCIUM SERPL-MCNC: 8.5 MG/DL (ref 8.3–10.6)
CALCIUM SERPL-MCNC: 8.6 MG/DL (ref 8.3–10.6)
CALCIUM SERPL-MCNC: 8.9 MG/DL (ref 8.3–10.6)
CALCIUM SERPL-MCNC: 9 MG/DL (ref 8.3–10.6)
CANNABINOID SCREEN URINE: ABNORMAL
CHLORIDE BLD-SCNC: 100 MMOL/L (ref 99–110)
CHLORIDE BLD-SCNC: 91 MMOL/L (ref 99–110)
CHLORIDE BLD-SCNC: 93 MMOL/L (ref 99–110)
CHLORIDE BLD-SCNC: 95 MMOL/L (ref 99–110)
CHLORIDE BLD-SCNC: 97 MMOL/L (ref 99–110)
CHLORIDE BLD-SCNC: 99 MMOL/L (ref 99–110)
CLARITY: CLEAR
CLARITY: CLEAR
CO2: 23 MMOL/L (ref 21–32)
CO2: 23 MMOL/L (ref 21–32)
CO2: 24 MMOL/L (ref 21–32)
CO2: 24 MMOL/L (ref 21–32)
CO2: 26 MMOL/L (ref 21–32)
CO2: 28 MMOL/L (ref 21–32)
COCAINE METABOLITE: NEGATIVE
COLOR: YELLOW
COLOR: YELLOW
COMPLEMENT C3: 118 MG/DL (ref 88–201)
COMPLEMENT C4: 12 MG/DL (ref 10–40)
CREAT SERPL-MCNC: 0.5 MG/DL (ref 0.9–1.3)
CREAT SERPL-MCNC: 0.6 MG/DL (ref 0.9–1.3)
CULTURE: NORMAL
DIFFERENTIAL TYPE: ABNORMAL
EKG ATRIAL RATE: 91 BPM
EKG DIAGNOSIS: NORMAL
EKG P AXIS: 56 DEGREES
EKG P-R INTERVAL: 160 MS
EKG Q-T INTERVAL: 344 MS
EKG QRS DURATION: 88 MS
EKG QTC CALCULATION (BAZETT): 423 MS
EKG R AXIS: 46 DEGREES
EKG T AXIS: 33 DEGREES
EKG VENTRICULAR RATE: 91 BPM
ENA TO SSA (RO) ANTIBODY: 6 AU/ML (ref 0–40)
ENA TO SSB (LA) ANTIBODY: 0 AU/ML (ref 0–40)
EOSINOPHILS ABSOLUTE: 0 K/CU MM
EOSINOPHILS ABSOLUTE: 0.1 K/CU MM
EOSINOPHILS RELATIVE PERCENT: 0.4 % (ref 0–3)
EOSINOPHILS RELATIVE PERCENT: 0.6 % (ref 0–3)
EOSINOPHILS RELATIVE PERCENT: 0.8 % (ref 0–3)
EOSINOPHILS RELATIVE PERCENT: 1.4 % (ref 0–3)
ERYTHROCYTE SEDIMENTATION RATE: 2 MM/HR (ref 0–20)
ERYTHROCYTE SEDIMENTATION RATE: 4 MM/HR (ref 0–20)
ESTIMATED AVERAGE GLUCOSE: 189 MG/DL
FERRITIN: 57 NG/ML (ref 30–400)
FOLATE: >20 NG/ML (ref 3.1–17.5)
GFR AFRICAN AMERICAN: >60 ML/MIN/1.73M2
GFR NON-AFRICAN AMERICAN: >60 ML/MIN/1.73M2
GLUCOSE BLD-MCNC: 108 MG/DL (ref 70–99)
GLUCOSE BLD-MCNC: 119 MG/DL (ref 70–99)
GLUCOSE BLD-MCNC: 144 MG/DL (ref 70–99)
GLUCOSE BLD-MCNC: 186 MG/DL (ref 70–99)
GLUCOSE BLD-MCNC: 196 MG/DL (ref 70–99)
GLUCOSE BLD-MCNC: 196 MG/DL (ref 70–99)
GLUCOSE BLD-MCNC: 201 MG/DL (ref 70–99)
GLUCOSE BLD-MCNC: 212 MG/DL (ref 70–99)
GLUCOSE BLD-MCNC: 224 MG/DL (ref 70–99)
GLUCOSE BLD-MCNC: 231 MG/DL (ref 70–99)
GLUCOSE BLD-MCNC: 234 MG/DL (ref 70–99)
GLUCOSE BLD-MCNC: 237 MG/DL (ref 70–99)
GLUCOSE BLD-MCNC: 253 MG/DL (ref 70–99)
GLUCOSE BLD-MCNC: 259 MG/DL (ref 70–99)
GLUCOSE BLD-MCNC: 292 MG/DL (ref 70–99)
GLUCOSE BLD-MCNC: 329 MG/DL (ref 70–99)
GLUCOSE BLD-MCNC: 385 MG/DL (ref 70–99)
GLUCOSE BLD-MCNC: 66 MG/DL (ref 70–99)
GLUCOSE BLD-MCNC: 86 MG/DL (ref 70–99)
GLUCOSE, URINE: 50 MG/DL
GLUCOSE, URINE: NEGATIVE MG/DL
HBA1C MFR BLD: 8.2 % (ref 4.2–6.3)
HCT VFR BLD CALC: 40.6 % (ref 42–52)
HCT VFR BLD CALC: 43.2 % (ref 42–52)
HCT VFR BLD CALC: 43.5 % (ref 42–52)
HCT VFR BLD CALC: 44.5 % (ref 42–52)
HCT VFR BLD CALC: 44.9 % (ref 42–52)
HCT VFR BLD CALC: 46.2 % (ref 42–52)
HCT VFR BLD CALC: 49 % (ref 42–52)
HEMOGLOBIN: 13.9 GM/DL (ref 13.5–18)
HEMOGLOBIN: 15 GM/DL (ref 13.5–18)
HEMOGLOBIN: 15 GM/DL (ref 13.5–18)
HEMOGLOBIN: 15.1 GM/DL (ref 13.5–18)
HEMOGLOBIN: 15.3 GM/DL (ref 13.5–18)
HEMOGLOBIN: 15.6 GM/DL (ref 13.5–18)
HEMOGLOBIN: 16.1 GM/DL (ref 13.5–18)
HIGH SENSITIVE C-REACTIVE PROTEIN: 1.5 MG/L
IMMATURE NEUTROPHIL %: 0.5 % (ref 0–0.43)
IMMATURE NEUTROPHIL %: 0.6 % (ref 0–0.43)
IMMATURE NEUTROPHIL %: 0.7 % (ref 0–0.43)
IRON: 68 UG/DL (ref 59–158)
KETONES, URINE: NEGATIVE MG/DL
KETONES, URINE: NEGATIVE MG/DL
LACTATE DEHYDROGENASE: 181 IU/L (ref 120–246)
LACTATE: 1.6 MMOL/L (ref 0.4–2)
LACTATE: 1.8 MMOL/L (ref 0.4–2)
LEUKOCYTE ESTERASE, URINE: NEGATIVE
LEUKOCYTE ESTERASE, URINE: NEGATIVE
LIPASE: 24 IU/L (ref 13–60)
LIPASE: 48 IU/L (ref 13–60)
LYMPHOCYTES ABSOLUTE: 0.7 K/CU MM
LYMPHOCYTES ABSOLUTE: 0.8 K/CU MM
LYMPHOCYTES ABSOLUTE: 0.9 K/CU MM
LYMPHOCYTES ABSOLUTE: 1 K/CU MM
LYMPHOCYTES RELATIVE PERCENT: 10.5 % (ref 24–44)
LYMPHOCYTES RELATIVE PERCENT: 12.2 % (ref 24–44)
LYMPHOCYTES RELATIVE PERCENT: 13 % (ref 24–44)
LYMPHOCYTES RELATIVE PERCENT: 13.1 % (ref 24–44)
Lab: NORMAL
MAGNESIUM: 2.1 MG/DL (ref 1.8–2.4)
MCH RBC QN AUTO: 29 PG (ref 27–31)
MCH RBC QN AUTO: 29.4 PG (ref 27–31)
MCH RBC QN AUTO: 29.5 PG (ref 27–31)
MCH RBC QN AUTO: 29.8 PG (ref 27–31)
MCH RBC QN AUTO: 30.1 PG (ref 27–31)
MCH RBC QN AUTO: 30.4 PG (ref 27–31)
MCH RBC QN AUTO: 30.6 PG (ref 27–31)
MCHC RBC AUTO-ENTMCNC: 32.9 % (ref 32–36)
MCHC RBC AUTO-ENTMCNC: 33.1 % (ref 32–36)
MCHC RBC AUTO-ENTMCNC: 33.6 % (ref 32–36)
MCHC RBC AUTO-ENTMCNC: 34.2 % (ref 32–36)
MCHC RBC AUTO-ENTMCNC: 34.5 % (ref 32–36)
MCHC RBC AUTO-ENTMCNC: 34.7 % (ref 32–36)
MCHC RBC AUTO-ENTMCNC: 35.1 % (ref 32–36)
MCV RBC AUTO: 84.9 FL (ref 78–100)
MCV RBC AUTO: 86.2 FL (ref 78–100)
MCV RBC AUTO: 86.6 FL (ref 78–100)
MCV RBC AUTO: 88.2 FL (ref 78–100)
MCV RBC AUTO: 89.2 FL (ref 78–100)
MCV RBC AUTO: 89.4 FL (ref 78–100)
MCV RBC AUTO: 89.6 FL (ref 78–100)
MONOCYTES ABSOLUTE: 0.3 K/CU MM
MONOCYTES ABSOLUTE: 0.4 K/CU MM
MONOCYTES ABSOLUTE: 0.5 K/CU MM
MONOCYTES ABSOLUTE: 0.6 K/CU MM
MONOCYTES RELATIVE PERCENT: 6.1 % (ref 0–4)
MONOCYTES RELATIVE PERCENT: 6.3 % (ref 0–4)
MONOCYTES RELATIVE PERCENT: 6.4 % (ref 0–4)
MONOCYTES RELATIVE PERCENT: 6.7 % (ref 0–4)
MS ALPHA-FETOPROTEIN: 5 NG/ML (ref 0–9)
MUCUS: ABNORMAL HPF
MUCUS: ABNORMAL HPF
NITRITE URINE, QUANTITATIVE: NEGATIVE
NITRITE URINE, QUANTITATIVE: NEGATIVE
NUCLEATED RBC %: 0 %
OPIATES, URINE: NEGATIVE
OXYCODONE: NEGATIVE
PCT TRANSFERRIN: 17 % (ref 10–44)
PDW BLD-RTO: 12.5 % (ref 11.7–14.9)
PDW BLD-RTO: 13.1 % (ref 11.7–14.9)
PDW BLD-RTO: 13.4 % (ref 11.7–14.9)
PDW BLD-RTO: 13.4 % (ref 11.7–14.9)
PDW BLD-RTO: 13.5 % (ref 11.7–14.9)
PDW BLD-RTO: 13.7 % (ref 11.7–14.9)
PDW BLD-RTO: 13.7 % (ref 11.7–14.9)
PH, URINE: 6 (ref 5–8)
PH, URINE: 7 (ref 5–8)
PHENCYCLIDINE, URINE: NEGATIVE
PHOSPHORUS: 3.6 MG/DL (ref 2.5–4.9)
PHOSPHORUS: 4.1 MG/DL (ref 2.5–4.9)
PLATELET # BLD: 103 K/CU MM (ref 140–440)
PLATELET # BLD: 62 K/CU MM (ref 140–440)
PLATELET # BLD: 74 K/CU MM (ref 140–440)
PLATELET # BLD: 75 K/CU MM (ref 140–440)
PLATELET # BLD: 80 K/CU MM (ref 140–440)
PLATELET # BLD: 92 K/CU MM (ref 140–440)
PLATELET # BLD: 94 K/CU MM (ref 140–440)
PMV BLD AUTO: 10.1 FL (ref 7.5–11.1)
PMV BLD AUTO: 10.1 FL (ref 7.5–11.1)
PMV BLD AUTO: 9 FL (ref 7.5–11.1)
PMV BLD AUTO: 9 FL (ref 7.5–11.1)
PMV BLD AUTO: 9.4 FL (ref 7.5–11.1)
PMV BLD AUTO: 9.6 FL (ref 7.5–11.1)
PMV BLD AUTO: 9.7 FL (ref 7.5–11.1)
POTASSIUM SERPL-SCNC: 4 MMOL/L (ref 3.5–5.1)
POTASSIUM SERPL-SCNC: 4.1 MMOL/L (ref 3.5–5.1)
POTASSIUM SERPL-SCNC: 4.1 MMOL/L (ref 3.5–5.1)
POTASSIUM SERPL-SCNC: 4.4 MMOL/L (ref 3.5–5.1)
POTASSIUM SERPL-SCNC: 4.4 MMOL/L (ref 3.5–5.1)
POTASSIUM SERPL-SCNC: 4.6 MMOL/L (ref 3.5–5.1)
PRO-BNP: 9.93 PG/ML
PROTEIN UA: NEGATIVE MG/DL
PROTEIN UA: NEGATIVE MG/DL
RBC # BLD: 4.54 M/CU MM (ref 4.6–6.2)
RBC # BLD: 4.93 M/CU MM (ref 4.6–6.2)
RBC # BLD: 4.99 M/CU MM (ref 4.6–6.2)
RBC # BLD: 5.18 M/CU MM (ref 4.6–6.2)
RBC # BLD: 5.21 M/CU MM (ref 4.6–6.2)
RBC # BLD: 5.24 M/CU MM (ref 4.6–6.2)
RBC # BLD: 5.47 M/CU MM (ref 4.6–6.2)
RBC URINE: 1 /HPF (ref 0–3)
RBC URINE: <1 /HPF (ref 0–3)
SARS-COV-2, NAAT: NOT DETECTED
SEGMENTED NEUTROPHILS ABSOLUTE COUNT: 4.2 K/CU MM
SEGMENTED NEUTROPHILS ABSOLUTE COUNT: 5.6 K/CU MM
SEGMENTED NEUTROPHILS ABSOLUTE COUNT: 6 K/CU MM
SEGMENTED NEUTROPHILS ABSOLUTE COUNT: 6.5 K/CU MM
SEGMENTED NEUTROPHILS RELATIVE PERCENT: 78.5 % (ref 36–66)
SEGMENTED NEUTROPHILS RELATIVE PERCENT: 79 % (ref 36–66)
SEGMENTED NEUTROPHILS RELATIVE PERCENT: 79.7 % (ref 36–66)
SEGMENTED NEUTROPHILS RELATIVE PERCENT: 81.7 % (ref 36–66)
SODIUM BLD-SCNC: 128 MMOL/L (ref 135–145)
SODIUM BLD-SCNC: 128 MMOL/L (ref 135–145)
SODIUM BLD-SCNC: 129 MMOL/L (ref 135–145)
SODIUM BLD-SCNC: 131 MMOL/L (ref 135–145)
SODIUM BLD-SCNC: 133 MMOL/L (ref 135–145)
SODIUM BLD-SCNC: 134 MMOL/L (ref 135–145)
SOURCE: NORMAL
SPECIFIC GRAVITY UA: 1.01 (ref 1–1.03)
SPECIFIC GRAVITY UA: 1.02 (ref 1–1.03)
SPECIMEN: NORMAL
SSA 60 RO IGG ANTIBODY: 1 AU/ML (ref 0–40)
STREP A DIRECT SCREEN: NEGATIVE
TOTAL CK: 72 IU/L (ref 38–174)
TOTAL IMMATURE NEUTOROPHIL: 0.04 K/CU MM
TOTAL IMMATURE NEUTOROPHIL: 0.05 K/CU MM
TOTAL IMMATURE NEUTOROPHIL: 0.05 K/CU MM
TOTAL IRON BINDING CAPACITY: 407 UG/DL (ref 250–450)
TOTAL NUCLEATED RBC: 0 K/CU MM
TOTAL PROTEIN: 6.6 GM/DL (ref 6.4–8.2)
TOTAL PROTEIN: 6.8 GM/DL (ref 6.4–8.2)
TOTAL PROTEIN: 6.8 GM/DL (ref 6.4–8.2)
TRICHOMONAS: ABNORMAL /HPF
TRICHOMONAS: ABNORMAL /HPF
TROPONIN T: <0.01 NG/ML
TSH HIGH SENSITIVITY: 1.43 UIU/ML (ref 0.27–4.2)
UNSATURATED IRON BINDING CAPACITY: 339 UG/DL (ref 110–370)
UROBILINOGEN, URINE: 2 MG/DL (ref 0.2–1)
UROBILINOGEN, URINE: NEGATIVE MG/DL (ref 0.2–1)
VITAMIN B-12: 1143 PG/ML (ref 211–911)
WBC # BLD: 10.7 K/CU MM (ref 4–10.5)
WBC # BLD: 5.2 K/CU MM (ref 4–10.5)
WBC # BLD: 5.5 K/CU MM (ref 4–10.5)
WBC # BLD: 5.7 K/CU MM (ref 4–10.5)
WBC # BLD: 7 K/CU MM (ref 4–10.5)
WBC # BLD: 7.3 K/CU MM (ref 4–10.5)
WBC # BLD: 8.3 K/CU MM (ref 4–10.5)
WBC UA: <1 /HPF (ref 0–2)
WBC UA: ABNORMAL /HPF (ref 0–2)

## 2021-01-01 PROCEDURE — 96372 THER/PROPH/DIAG INJ SC/IM: CPT

## 2021-01-01 PROCEDURE — 96374 THER/PROPH/DIAG INJ IV PUSH: CPT

## 2021-01-01 PROCEDURE — 85652 RBC SED RATE AUTOMATED: CPT

## 2021-01-01 PROCEDURE — 87081 CULTURE SCREEN ONLY: CPT

## 2021-01-01 PROCEDURE — 96367 TX/PROPH/DG ADDL SEQ IV INF: CPT

## 2021-01-01 PROCEDURE — 4040F PNEUMOC VAC/ADMIN/RCVD: CPT | Performed by: INTERNAL MEDICINE

## 2021-01-01 PROCEDURE — 2580000003 HC RX 258: Performed by: PHYSICIAN ASSISTANT

## 2021-01-01 PROCEDURE — 97166 OT EVAL MOD COMPLEX 45 MIN: CPT

## 2021-01-01 PROCEDURE — 6370000000 HC RX 637 (ALT 250 FOR IP): Performed by: HOSPITALIST

## 2021-01-01 PROCEDURE — 6370000000 HC RX 637 (ALT 250 FOR IP): Performed by: INTERNAL MEDICINE

## 2021-01-01 PROCEDURE — 2500000003 HC RX 250 WO HCPCS: Performed by: INTERNAL MEDICINE

## 2021-01-01 PROCEDURE — 6360000002 HC RX W HCPCS: Performed by: INTERNAL MEDICINE

## 2021-01-01 PROCEDURE — G0378 HOSPITAL OBSERVATION PER HR: HCPCS

## 2021-01-01 PROCEDURE — 83880 ASSAY OF NATRIURETIC PEPTIDE: CPT

## 2021-01-01 PROCEDURE — 1200000000 HC SEMI PRIVATE

## 2021-01-01 PROCEDURE — 87635 SARS-COV-2 COVID-19 AMP PRB: CPT

## 2021-01-01 PROCEDURE — 87040 BLOOD CULTURE FOR BACTERIA: CPT

## 2021-01-01 PROCEDURE — 96375 TX/PRO/DX INJ NEW DRUG ADDON: CPT

## 2021-01-01 PROCEDURE — 2580000003 HC RX 258: Performed by: INTERNAL MEDICINE

## 2021-01-01 PROCEDURE — 36415 COLL VENOUS BLD VENIPUNCTURE: CPT

## 2021-01-01 PROCEDURE — 86235 NUCLEAR ANTIGEN ANTIBODY: CPT

## 2021-01-01 PROCEDURE — 82565 ASSAY OF CREATININE: CPT

## 2021-01-01 PROCEDURE — 6370000000 HC RX 637 (ALT 250 FOR IP): Performed by: NURSE PRACTITIONER

## 2021-01-01 PROCEDURE — 82962 GLUCOSE BLOOD TEST: CPT

## 2021-01-01 PROCEDURE — 84520 ASSAY OF UREA NITROGEN: CPT

## 2021-01-01 PROCEDURE — 94761 N-INVAS EAR/PLS OXIMETRY MLT: CPT

## 2021-01-01 PROCEDURE — 83690 ASSAY OF LIPASE: CPT

## 2021-01-01 PROCEDURE — 80053 COMPREHEN METABOLIC PANEL: CPT

## 2021-01-01 PROCEDURE — 84450 TRANSFERASE (AST) (SGOT): CPT

## 2021-01-01 PROCEDURE — 96365 THER/PROPH/DIAG IV INF INIT: CPT

## 2021-01-01 PROCEDURE — 99213 OFFICE O/P EST LOW 20 MIN: CPT | Performed by: INTERNAL MEDICINE

## 2021-01-01 PROCEDURE — 1123F ACP DISCUSS/DSCN MKR DOCD: CPT | Performed by: INTERNAL MEDICINE

## 2021-01-01 PROCEDURE — 83605 ASSAY OF LACTIC ACID: CPT

## 2021-01-01 PROCEDURE — 82607 VITAMIN B-12: CPT

## 2021-01-01 PROCEDURE — 80307 DRUG TEST PRSMV CHEM ANLYZR: CPT

## 2021-01-01 PROCEDURE — 82105 ALPHA-FETOPROTEIN SERUM: CPT

## 2021-01-01 PROCEDURE — 6360000002 HC RX W HCPCS: Performed by: NURSE PRACTITIONER

## 2021-01-01 PROCEDURE — 99285 EMERGENCY DEPT VISIT HI MDM: CPT

## 2021-01-01 PROCEDURE — 86141 C-REACTIVE PROTEIN HS: CPT

## 2021-01-01 PROCEDURE — 80069 RENAL FUNCTION PANEL: CPT

## 2021-01-01 PROCEDURE — 81001 URINALYSIS AUTO W/SCOPE: CPT

## 2021-01-01 PROCEDURE — G8482 FLU IMMUNIZE ORDER/ADMIN: HCPCS | Performed by: INTERNAL MEDICINE

## 2021-01-01 PROCEDURE — 99211 OFF/OP EST MAY X REQ PHY/QHP: CPT

## 2021-01-01 PROCEDURE — 85025 COMPLETE CBC W/AUTO DIFF WBC: CPT

## 2021-01-01 PROCEDURE — 6360000004 HC RX CONTRAST MEDICATION: Performed by: PHYSICIAN ASSISTANT

## 2021-01-01 PROCEDURE — 96366 THER/PROPH/DIAG IV INF ADDON: CPT

## 2021-01-01 PROCEDURE — 97530 THERAPEUTIC ACTIVITIES: CPT

## 2021-01-01 PROCEDURE — 83540 ASSAY OF IRON: CPT

## 2021-01-01 PROCEDURE — 86160 COMPLEMENT ANTIGEN: CPT

## 2021-01-01 PROCEDURE — 96376 TX/PRO/DX INJ SAME DRUG ADON: CPT

## 2021-01-01 PROCEDURE — 6370000000 HC RX 637 (ALT 250 FOR IP): Performed by: EMERGENCY MEDICINE

## 2021-01-01 PROCEDURE — 83615 LACTATE (LD) (LDH) ENZYME: CPT

## 2021-01-01 PROCEDURE — 80048 BASIC METABOLIC PNL TOTAL CA: CPT

## 2021-01-01 PROCEDURE — 86225 DNA ANTIBODY NATIVE: CPT

## 2021-01-01 PROCEDURE — 93010 ELECTROCARDIOGRAM REPORT: CPT | Performed by: INTERNAL MEDICINE

## 2021-01-01 PROCEDURE — 6360000002 HC RX W HCPCS: Performed by: EMERGENCY MEDICINE

## 2021-01-01 PROCEDURE — G8427 DOCREV CUR MEDS BY ELIG CLIN: HCPCS | Performed by: INTERNAL MEDICINE

## 2021-01-01 PROCEDURE — 83735 ASSAY OF MAGNESIUM: CPT

## 2021-01-01 PROCEDURE — G0463 HOSPITAL OUTPT CLINIC VISIT: HCPCS

## 2021-01-01 PROCEDURE — 84484 ASSAY OF TROPONIN QUANT: CPT

## 2021-01-01 PROCEDURE — 6370000000 HC RX 637 (ALT 250 FOR IP): Performed by: PHYSICIAN ASSISTANT

## 2021-01-01 PROCEDURE — 71045 X-RAY EXAM CHEST 1 VIEW: CPT

## 2021-01-01 PROCEDURE — 85027 COMPLETE CBC AUTOMATED: CPT

## 2021-01-01 PROCEDURE — 72131 CT LUMBAR SPINE W/O DYE: CPT

## 2021-01-01 PROCEDURE — 82550 ASSAY OF CK (CPK): CPT

## 2021-01-01 PROCEDURE — 87430 STREP A AG IA: CPT

## 2021-01-01 PROCEDURE — 3017F COLORECTAL CA SCREEN DOC REV: CPT | Performed by: INTERNAL MEDICINE

## 2021-01-01 PROCEDURE — G8417 CALC BMI ABV UP PARAM F/U: HCPCS | Performed by: INTERNAL MEDICINE

## 2021-01-01 PROCEDURE — 6360000002 HC RX W HCPCS: Performed by: HOSPITALIST

## 2021-01-01 PROCEDURE — 72125 CT NECK SPINE W/O DYE: CPT

## 2021-01-01 PROCEDURE — 82248 BILIRUBIN DIRECT: CPT

## 2021-01-01 PROCEDURE — 2580000003 HC RX 258: Performed by: EMERGENCY MEDICINE

## 2021-01-01 PROCEDURE — 72128 CT CHEST SPINE W/O DYE: CPT

## 2021-01-01 PROCEDURE — 84460 ALANINE AMINO (ALT) (SGPT): CPT

## 2021-01-01 PROCEDURE — 6360000002 HC RX W HCPCS: Performed by: PHYSICIAN ASSISTANT

## 2021-01-01 PROCEDURE — 74177 CT ABD & PELVIS W/CONTRAST: CPT

## 2021-01-01 PROCEDURE — 93005 ELECTROCARDIOGRAM TRACING: CPT | Performed by: PHYSICIAN ASSISTANT

## 2021-01-01 PROCEDURE — 82728 ASSAY OF FERRITIN: CPT

## 2021-01-01 PROCEDURE — 84443 ASSAY THYROID STIM HORMONE: CPT

## 2021-01-01 PROCEDURE — 76937 US GUIDE VASCULAR ACCESS: CPT

## 2021-01-01 PROCEDURE — 4004F PT TOBACCO SCREEN RCVD TLK: CPT | Performed by: INTERNAL MEDICINE

## 2021-01-01 PROCEDURE — 82746 ASSAY OF FOLIC ACID SERUM: CPT

## 2021-01-01 PROCEDURE — 83036 HEMOGLOBIN GLYCOSYLATED A1C: CPT

## 2021-01-01 PROCEDURE — 83550 IRON BINDING TEST: CPT

## 2021-01-01 PROCEDURE — 2500000003 HC RX 250 WO HCPCS: Performed by: EMERGENCY MEDICINE

## 2021-01-01 RX ORDER — ARIPIPRAZOLE 10 MG/1
20 TABLET ORAL DAILY
Status: DISCONTINUED | OUTPATIENT
Start: 2021-01-01 | End: 2021-01-01

## 2021-01-01 RX ORDER — SODIUM PHOSPHATE, DIBASIC AND SODIUM PHOSPHATE, MONOBASIC 7; 19 G/133ML; G/133ML
1 ENEMA RECTAL ONCE
Status: DISCONTINUED | OUTPATIENT
Start: 2021-01-01 | End: 2021-01-01 | Stop reason: HOSPADM

## 2021-01-01 RX ORDER — GABAPENTIN 100 MG/1
100 CAPSULE ORAL 3 TIMES DAILY
COMMUNITY
Start: 2021-01-01

## 2021-01-01 RX ORDER — CIPROFLOXACIN 2 MG/ML
400 INJECTION, SOLUTION INTRAVENOUS EVERY 12 HOURS
Status: DISCONTINUED | OUTPATIENT
Start: 2021-01-01 | End: 2021-01-01

## 2021-01-01 RX ORDER — BACLOFEN 10 MG/1
20 TABLET ORAL 3 TIMES DAILY
Status: DISCONTINUED | OUTPATIENT
Start: 2021-01-01 | End: 2021-01-01 | Stop reason: HOSPADM

## 2021-01-01 RX ORDER — OXYCODONE HYDROCHLORIDE 10 MG/1
10 TABLET ORAL 4 TIMES DAILY PRN
COMMUNITY
Start: 2021-01-01

## 2021-01-01 RX ORDER — TRAZODONE HYDROCHLORIDE 50 MG/1
100 TABLET ORAL NIGHTLY
Status: DISCONTINUED | OUTPATIENT
Start: 2021-01-01 | End: 2021-01-01 | Stop reason: HOSPADM

## 2021-01-01 RX ORDER — 0.9 % SODIUM CHLORIDE 0.9 %
1000 INTRAVENOUS SOLUTION INTRAVENOUS ONCE
Status: COMPLETED | OUTPATIENT
Start: 2021-01-01 | End: 2021-01-01

## 2021-01-01 RX ORDER — INDOMETHACIN 25 MG/1
50 CAPSULE ORAL
Status: DISCONTINUED | OUTPATIENT
Start: 2021-01-01 | End: 2021-01-01 | Stop reason: HOSPADM

## 2021-01-01 RX ORDER — 0.9 % SODIUM CHLORIDE 0.9 %
500 INTRAVENOUS SOLUTION INTRAVENOUS ONCE
Status: COMPLETED | OUTPATIENT
Start: 2021-01-01 | End: 2021-01-01

## 2021-01-01 RX ORDER — ACETAMINOPHEN 325 MG/1
650 TABLET ORAL EVERY 6 HOURS PRN
Status: DISCONTINUED | OUTPATIENT
Start: 2021-01-01 | End: 2021-01-01

## 2021-01-01 RX ORDER — METHYLPREDNISOLONE SODIUM SUCCINATE 40 MG/ML
40 INJECTION, POWDER, LYOPHILIZED, FOR SOLUTION INTRAMUSCULAR; INTRAVENOUS DAILY
Status: DISCONTINUED | OUTPATIENT
Start: 2021-01-01 | End: 2021-01-01

## 2021-01-01 RX ORDER — NALOXONE HYDROCHLORIDE 4 MG/.1ML
1 SPRAY NASAL PRN
Qty: 3 EACH | Refills: 0 | Status: SHIPPED | OUTPATIENT
Start: 2021-01-01 | End: 2021-01-01 | Stop reason: SDUPTHER

## 2021-01-01 RX ORDER — TRAZODONE HYDROCHLORIDE 100 MG/1
100 TABLET ORAL NIGHTLY
Qty: 30 TABLET | Refills: 0 | Status: SHIPPED | OUTPATIENT
Start: 2021-01-01 | End: 2021-01-01

## 2021-01-01 RX ORDER — ONDANSETRON 4 MG/1
4 TABLET, ORALLY DISINTEGRATING ORAL 3 TIMES DAILY PRN
Qty: 21 TABLET | Refills: 0 | Status: SHIPPED | OUTPATIENT
Start: 2021-01-01

## 2021-01-01 RX ORDER — SODIUM CHLORIDE 0.9 % (FLUSH) 0.9 %
5-40 SYRINGE (ML) INJECTION PRN
Status: DISCONTINUED | OUTPATIENT
Start: 2021-01-01 | End: 2021-01-01 | Stop reason: HOSPADM

## 2021-01-01 RX ORDER — METHOCARBAMOL 750 MG/1
750 TABLET, FILM COATED ORAL ONCE
Status: COMPLETED | OUTPATIENT
Start: 2021-01-01 | End: 2021-01-01

## 2021-01-01 RX ORDER — PROMETHAZINE HYDROCHLORIDE 25 MG/1
12.5 TABLET ORAL EVERY 6 HOURS PRN
Status: DISCONTINUED | OUTPATIENT
Start: 2021-01-01 | End: 2021-01-01 | Stop reason: HOSPADM

## 2021-01-01 RX ORDER — DIAPER,BRIEF,INFANT-TODD,DISP
EACH MISCELLANEOUS ONCE
Status: COMPLETED | OUTPATIENT
Start: 2021-01-01 | End: 2021-01-01

## 2021-01-01 RX ORDER — NALOXONE HYDROCHLORIDE 4 MG/.1ML
1 SPRAY NASAL PRN
Qty: 1 EACH | Refills: 5 | Status: SHIPPED | OUTPATIENT
Start: 2021-01-01

## 2021-01-01 RX ORDER — PREGABALIN 100 MG/1
100 CAPSULE ORAL 3 TIMES DAILY
Status: DISCONTINUED | OUTPATIENT
Start: 2021-01-01 | End: 2021-01-01 | Stop reason: HOSPADM

## 2021-01-01 RX ORDER — POLYETHYLENE GLYCOL 3350 17 G/17G
17 POWDER, FOR SOLUTION ORAL DAILY PRN
Status: DISCONTINUED | OUTPATIENT
Start: 2021-01-01 | End: 2021-01-01 | Stop reason: HOSPADM

## 2021-01-01 RX ORDER — CLINDAMYCIN HYDROCHLORIDE 300 MG/1
300 CAPSULE ORAL 3 TIMES DAILY
Qty: 30 CAPSULE | Refills: 0 | Status: SHIPPED | OUTPATIENT
Start: 2021-01-01 | End: 2021-06-24

## 2021-01-01 RX ORDER — LISINOPRIL 20 MG/1
20 TABLET ORAL DAILY
COMMUNITY

## 2021-01-01 RX ORDER — INSULIN GLARGINE 100 [IU]/ML
65 INJECTION, SOLUTION SUBCUTANEOUS NIGHTLY
Qty: 5 VIAL | Refills: 5 | Status: SHIPPED | OUTPATIENT
Start: 2021-01-01

## 2021-01-01 RX ORDER — ACETAMINOPHEN 650 MG/1
650 SUPPOSITORY RECTAL EVERY 6 HOURS PRN
Status: DISCONTINUED | OUTPATIENT
Start: 2021-01-01 | End: 2021-01-01

## 2021-01-01 RX ORDER — INDOMETHACIN 50 MG/1
50 CAPSULE ORAL 2 TIMES DAILY WITH MEALS
Qty: 14 CAPSULE | Refills: 0 | Status: SHIPPED | OUTPATIENT
Start: 2021-01-01 | End: 2021-01-01

## 2021-01-01 RX ORDER — OXYCODONE HYDROCHLORIDE 10 MG/1
10 TABLET ORAL EVERY 6 HOURS PRN
Status: DISCONTINUED | OUTPATIENT
Start: 2021-01-01 | End: 2021-01-01 | Stop reason: HOSPADM

## 2021-01-01 RX ORDER — BACLOFEN 10 MG/1
10 TABLET ORAL 2 TIMES DAILY
COMMUNITY
Start: 2021-01-01

## 2021-01-01 RX ORDER — TRAMADOL HYDROCHLORIDE 50 MG/1
50 TABLET ORAL EVERY 4 HOURS PRN
Qty: 18 TABLET | Refills: 0 | Status: SHIPPED | OUTPATIENT
Start: 2021-01-01 | End: 2021-01-01

## 2021-01-01 RX ORDER — ALBUTEROL SULFATE 90 UG/1
2 AEROSOL, METERED RESPIRATORY (INHALATION) EVERY 6 HOURS PRN
Status: DISCONTINUED | OUTPATIENT
Start: 2021-01-01 | End: 2021-01-01 | Stop reason: HOSPADM

## 2021-01-01 RX ORDER — MORPHINE SULFATE 4 MG/ML
4 INJECTION, SOLUTION INTRAMUSCULAR; INTRAVENOUS ONCE
Status: COMPLETED | OUTPATIENT
Start: 2021-01-01 | End: 2021-01-01

## 2021-01-01 RX ORDER — HYDROXYCHLOROQUINE SULFATE 200 MG/1
200 TABLET, FILM COATED ORAL DAILY
Status: DISCONTINUED | OUTPATIENT
Start: 2021-01-01 | End: 2021-01-01 | Stop reason: HOSPADM

## 2021-01-01 RX ORDER — INSULIN GLARGINE 100 [IU]/ML
65 INJECTION, SOLUTION SUBCUTANEOUS NIGHTLY
Qty: 5 VIAL | Refills: 5
Start: 2021-01-01 | End: 2021-01-01 | Stop reason: SDUPTHER

## 2021-01-01 RX ORDER — NICOTINE 21 MG/24HR
1 PATCH, TRANSDERMAL 24 HOURS TRANSDERMAL DAILY
Status: DISCONTINUED | OUTPATIENT
Start: 2021-01-01 | End: 2021-01-01 | Stop reason: HOSPADM

## 2021-01-01 RX ORDER — OXYCODONE HYDROCHLORIDE 10 MG/1
10 TABLET ORAL EVERY 6 HOURS PRN
Qty: 20 TABLET | Refills: 0 | Status: SHIPPED | OUTPATIENT
Start: 2021-01-01 | End: 2021-01-01

## 2021-01-01 RX ORDER — SODIUM CHLORIDE 0.9 % (FLUSH) 0.9 %
5-40 SYRINGE (ML) INJECTION EVERY 12 HOURS SCHEDULED
Status: DISCONTINUED | OUTPATIENT
Start: 2021-01-01 | End: 2021-01-01 | Stop reason: HOSPADM

## 2021-01-01 RX ORDER — INSULIN GLARGINE 100 [IU]/ML
50 INJECTION, SOLUTION SUBCUTANEOUS NIGHTLY
Status: DISCONTINUED | OUTPATIENT
Start: 2021-01-01 | End: 2021-01-01 | Stop reason: HOSPADM

## 2021-01-01 RX ORDER — DEXTROSE MONOHYDRATE 50 MG/ML
100 INJECTION, SOLUTION INTRAVENOUS PRN
Status: DISCONTINUED | OUTPATIENT
Start: 2021-01-01 | End: 2021-01-01 | Stop reason: HOSPADM

## 2021-01-01 RX ORDER — AMOXICILLIN AND CLAVULANATE POTASSIUM 875; 125 MG/1; MG/1
1 TABLET, FILM COATED ORAL ONCE
Status: COMPLETED | OUTPATIENT
Start: 2021-01-01 | End: 2021-01-01

## 2021-01-01 RX ORDER — INSULIN GLARGINE 100 [IU]/ML
60 INJECTION, SOLUTION SUBCUTANEOUS 2 TIMES DAILY
Status: DISCONTINUED | OUTPATIENT
Start: 2021-01-01 | End: 2021-01-01

## 2021-01-01 RX ORDER — LISINOPRIL 5 MG/1
5 TABLET ORAL DAILY
Status: DISCONTINUED | OUTPATIENT
Start: 2021-01-01 | End: 2021-01-01 | Stop reason: HOSPADM

## 2021-01-01 RX ORDER — SODIUM CHLORIDE 9 MG/ML
25 INJECTION, SOLUTION INTRAVENOUS PRN
Status: DISCONTINUED | OUTPATIENT
Start: 2021-01-01 | End: 2021-01-01 | Stop reason: HOSPADM

## 2021-01-01 RX ORDER — MORPHINE SULFATE 4 MG/ML
5 INJECTION, SOLUTION INTRAMUSCULAR; INTRAVENOUS ONCE
Status: COMPLETED | OUTPATIENT
Start: 2021-01-01 | End: 2021-01-01

## 2021-01-01 RX ORDER — BACLOFEN 20 MG/1
20 TABLET ORAL 3 TIMES DAILY
Qty: 30 TABLET | Refills: 0 | Status: SHIPPED | OUTPATIENT
Start: 2021-01-01 | End: 2021-01-01

## 2021-01-01 RX ORDER — KETOROLAC TROMETHAMINE 30 MG/ML
30 INJECTION, SOLUTION INTRAMUSCULAR; INTRAVENOUS ONCE
Status: COMPLETED | OUTPATIENT
Start: 2021-01-01 | End: 2021-01-01

## 2021-01-01 RX ORDER — FENTANYL CITRATE 50 UG/ML
25 INJECTION, SOLUTION INTRAMUSCULAR; INTRAVENOUS ONCE
Status: COMPLETED | OUTPATIENT
Start: 2021-01-01 | End: 2021-01-01

## 2021-01-01 RX ORDER — DEXTROSE MONOHYDRATE 25 G/50ML
50 INJECTION, SOLUTION INTRAVENOUS ONCE
Status: COMPLETED | OUTPATIENT
Start: 2021-01-01 | End: 2021-01-01

## 2021-01-01 RX ORDER — ONDANSETRON 2 MG/ML
4 INJECTION INTRAMUSCULAR; INTRAVENOUS ONCE
Status: COMPLETED | OUTPATIENT
Start: 2021-01-01 | End: 2021-01-01

## 2021-01-01 RX ORDER — ONDANSETRON 2 MG/ML
4 INJECTION INTRAMUSCULAR; INTRAVENOUS EVERY 6 HOURS PRN
Status: DISCONTINUED | OUTPATIENT
Start: 2021-01-01 | End: 2021-01-01 | Stop reason: HOSPADM

## 2021-01-01 RX ORDER — NALOXONE HYDROCHLORIDE 4 MG/.1ML
1 SPRAY NASAL PRN
Qty: 3 EACH | Refills: 0 | Status: SHIPPED | OUTPATIENT
Start: 2021-01-01

## 2021-01-01 RX ORDER — PNV NO.95/FERROUS FUM/FOLIC AC 28MG-0.8MG
TABLET ORAL
Qty: 90 TABLET | Refills: 1 | Status: SHIPPED | OUTPATIENT
Start: 2021-01-01

## 2021-01-01 RX ORDER — DEXTROSE MONOHYDRATE 25 G/50ML
12.5 INJECTION, SOLUTION INTRAVENOUS PRN
Status: DISCONTINUED | OUTPATIENT
Start: 2021-01-01 | End: 2021-01-01 | Stop reason: HOSPADM

## 2021-01-01 RX ORDER — HYDROMORPHONE HCL 110MG/55ML
0.5 PATIENT CONTROLLED ANALGESIA SYRINGE INTRAVENOUS EVERY 4 HOURS PRN
Status: DISCONTINUED | OUTPATIENT
Start: 2021-01-01 | End: 2021-01-01

## 2021-01-01 RX ORDER — POLYETHYLENE GLYCOL 3350 17 G/17G
17 POWDER, FOR SOLUTION ORAL ONCE
Status: COMPLETED | OUTPATIENT
Start: 2021-01-01 | End: 2021-01-01

## 2021-01-01 RX ORDER — NICOTINE POLACRILEX 4 MG
15 LOZENGE BUCCAL PRN
Status: DISCONTINUED | OUTPATIENT
Start: 2021-01-01 | End: 2021-01-01 | Stop reason: HOSPADM

## 2021-01-01 RX ORDER — DIPHENHYDRAMINE HYDROCHLORIDE 50 MG/ML
25 INJECTION INTRAMUSCULAR; INTRAVENOUS EVERY 6 HOURS PRN
Status: DISCONTINUED | OUTPATIENT
Start: 2021-01-01 | End: 2021-01-01 | Stop reason: HOSPADM

## 2021-01-01 RX ORDER — OXYCODONE AND ACETAMINOPHEN 7.5; 325 MG/1; MG/1
1 TABLET ORAL EVERY 6 HOURS PRN
Qty: 20 TABLET | Refills: 0 | Status: CANCELLED | OUTPATIENT
Start: 2021-01-01 | End: 2021-01-01

## 2021-01-01 RX ORDER — PREDNISONE 1 MG/1
5 TABLET ORAL DAILY
COMMUNITY
Start: 2021-01-01

## 2021-01-01 RX ADMIN — FENTANYL CITRATE 25 MCG: 50 INJECTION INTRAMUSCULAR; INTRAVENOUS at 12:34

## 2021-01-01 RX ADMIN — MORPHINE SULFATE 5 MG: 4 INJECTION, SOLUTION INTRAMUSCULAR; INTRAVENOUS at 09:55

## 2021-01-01 RX ADMIN — BACLOFEN 20 MG: 10 TABLET ORAL at 10:17

## 2021-01-01 RX ADMIN — OXYCODONE HYDROCHLORIDE 10 MG: 10 TABLET ORAL at 11:32

## 2021-01-01 RX ADMIN — METHYLPREDNISOLONE SODIUM SUCCINATE 40 MG: 40 INJECTION, POWDER, FOR SOLUTION INTRAMUSCULAR; INTRAVENOUS at 12:35

## 2021-01-01 RX ADMIN — HYDROMORPHONE HYDROCHLORIDE 0.5 MG: 2 INJECTION, SOLUTION INTRAMUSCULAR; INTRAVENOUS; SUBCUTANEOUS at 02:14

## 2021-01-01 RX ADMIN — ENOXAPARIN SODIUM 30 MG: 30 INJECTION SUBCUTANEOUS at 09:16

## 2021-01-01 RX ADMIN — ONDANSETRON 4 MG: 2 INJECTION INTRAMUSCULAR; INTRAVENOUS at 12:27

## 2021-01-01 RX ADMIN — INSULIN LISPRO 6 UNITS: 100 INJECTION, SOLUTION INTRAVENOUS; SUBCUTANEOUS at 12:16

## 2021-01-01 RX ADMIN — PREGABALIN 100 MG: 100 CAPSULE ORAL at 20:26

## 2021-01-01 RX ADMIN — LISINOPRIL 5 MG: 5 TABLET ORAL at 08:55

## 2021-01-01 RX ADMIN — PREGABALIN 100 MG: 100 CAPSULE ORAL at 20:41

## 2021-01-01 RX ADMIN — SODIUM CHLORIDE 500 ML: 9 INJECTION, SOLUTION INTRAVENOUS at 12:32

## 2021-01-01 RX ADMIN — AMOXICILLIN AND CLAVULANATE POTASSIUM 1 TABLET: 875; 125 TABLET, FILM COATED ORAL at 14:34

## 2021-01-01 RX ADMIN — OXYCODONE HYDROCHLORIDE 10 MG: 10 TABLET ORAL at 20:41

## 2021-01-01 RX ADMIN — INDOMETHACIN 50 MG: 25 CAPSULE ORAL at 13:32

## 2021-01-01 RX ADMIN — LISINOPRIL 5 MG: 5 TABLET ORAL at 09:16

## 2021-01-01 RX ADMIN — INSULIN LISPRO 4 UNITS: 100 INJECTION, SOLUTION INTRAVENOUS; SUBCUTANEOUS at 12:44

## 2021-01-01 RX ADMIN — HYDROMORPHONE HYDROCHLORIDE 0.5 MG: 2 INJECTION, SOLUTION INTRAMUSCULAR; INTRAVENOUS; SUBCUTANEOUS at 17:04

## 2021-01-01 RX ADMIN — BACLOFEN 20 MG: 10 TABLET ORAL at 00:05

## 2021-01-01 RX ADMIN — BACLOFEN 20 MG: 10 TABLET ORAL at 20:26

## 2021-01-01 RX ADMIN — MORPHINE SULFATE 4 MG: 4 INJECTION, SOLUTION INTRAMUSCULAR; INTRAVENOUS at 12:55

## 2021-01-01 RX ADMIN — BACLOFEN 20 MG: 10 TABLET ORAL at 20:41

## 2021-01-01 RX ADMIN — SODIUM CHLORIDE, PRESERVATIVE FREE 10 ML: 5 INJECTION INTRAVENOUS at 10:23

## 2021-01-01 RX ADMIN — DIPHENHYDRAMINE HYDROCHLORIDE 25 MG: 50 INJECTION INTRAMUSCULAR; INTRAVENOUS at 02:15

## 2021-01-01 RX ADMIN — SODIUM CHLORIDE 1000 ML: 9 INJECTION, SOLUTION INTRAVENOUS at 09:55

## 2021-01-01 RX ADMIN — BACLOFEN 20 MG: 10 TABLET ORAL at 13:48

## 2021-01-01 RX ADMIN — PREGABALIN 100 MG: 100 CAPSULE ORAL at 10:21

## 2021-01-01 RX ADMIN — INDOMETHACIN 50 MG: 25 CAPSULE ORAL at 09:16

## 2021-01-01 RX ADMIN — HYDROXYCHLOROQUINE SULFATE 200 MG: 200 TABLET, FILM COATED ORAL at 09:25

## 2021-01-01 RX ADMIN — METHOCARBAMOL TABLETS 750 MG: 750 TABLET, COATED ORAL at 14:34

## 2021-01-01 RX ADMIN — BISACODYL 10 MG: 5 TABLET, COATED ORAL at 14:17

## 2021-01-01 RX ADMIN — KETOROLAC TROMETHAMINE 30 MG: 30 INJECTION, SOLUTION INTRAMUSCULAR; INTRAVENOUS at 12:53

## 2021-01-01 RX ADMIN — METRONIDAZOLE 500 MG: 500 INJECTION, SOLUTION INTRAVENOUS at 21:54

## 2021-01-01 RX ADMIN — BACLOFEN 20 MG: 10 TABLET ORAL at 08:55

## 2021-01-01 RX ADMIN — INSULIN GLARGINE 60 UNITS: 100 INJECTION, SOLUTION SUBCUTANEOUS at 22:15

## 2021-01-01 RX ADMIN — INSULIN LISPRO 3 UNITS: 100 INJECTION, SOLUTION INTRAVENOUS; SUBCUTANEOUS at 13:27

## 2021-01-01 RX ADMIN — INDOMETHACIN 50 MG: 25 CAPSULE ORAL at 17:27

## 2021-01-01 RX ADMIN — HYDROMORPHONE HYDROCHLORIDE 0.5 MG: 2 INJECTION, SOLUTION INTRAMUSCULAR; INTRAVENOUS; SUBCUTANEOUS at 08:21

## 2021-01-01 RX ADMIN — HYDROXYCHLOROQUINE SULFATE 200 MG: 200 TABLET, FILM COATED ORAL at 10:18

## 2021-01-01 RX ADMIN — PREGABALIN 100 MG: 100 CAPSULE ORAL at 14:18

## 2021-01-01 RX ADMIN — ARIPIPRAZOLE 20 MG: 10 TABLET ORAL at 08:54

## 2021-01-01 RX ADMIN — HYDROXYCHLOROQUINE SULFATE 200 MG: 200 TABLET, FILM COATED ORAL at 08:55

## 2021-01-01 RX ADMIN — CIPROFLOXACIN 400 MG: 2 INJECTION, SOLUTION INTRAVENOUS at 23:26

## 2021-01-01 RX ADMIN — OXYCODONE HYDROCHLORIDE 10 MG: 10 TABLET ORAL at 05:22

## 2021-01-01 RX ADMIN — INSULIN LISPRO 6 UNITS: 100 INJECTION, SOLUTION INTRAVENOUS; SUBCUTANEOUS at 09:18

## 2021-01-01 RX ADMIN — INSULIN GLARGINE 50 UNITS: 100 INJECTION, SOLUTION SUBCUTANEOUS at 00:06

## 2021-01-01 RX ADMIN — METRONIDAZOLE 500 MG: 500 INJECTION, SOLUTION INTRAVENOUS at 13:48

## 2021-01-01 RX ADMIN — INSULIN GLARGINE 50 UNITS: 100 INJECTION, SOLUTION SUBCUTANEOUS at 20:34

## 2021-01-01 RX ADMIN — TRAZODONE HYDROCHLORIDE 100 MG: 50 TABLET ORAL at 20:26

## 2021-01-01 RX ADMIN — INSULIN LISPRO 6 UNITS: 100 INJECTION, SOLUTION INTRAVENOUS; SUBCUTANEOUS at 18:25

## 2021-01-01 RX ADMIN — INSULIN LISPRO 3 UNITS: 100 INJECTION, SOLUTION INTRAVENOUS; SUBCUTANEOUS at 09:49

## 2021-01-01 RX ADMIN — PREGABALIN 100 MG: 100 CAPSULE ORAL at 13:48

## 2021-01-01 RX ADMIN — HYDROMORPHONE HYDROCHLORIDE 0.5 MG: 2 INJECTION, SOLUTION INTRAMUSCULAR; INTRAVENOUS; SUBCUTANEOUS at 21:54

## 2021-01-01 RX ADMIN — SODIUM CHLORIDE, PRESERVATIVE FREE 10 ML: 5 INJECTION INTRAVENOUS at 00:05

## 2021-01-01 RX ADMIN — BACLOFEN 20 MG: 10 TABLET ORAL at 09:16

## 2021-01-01 RX ADMIN — ONDANSETRON 4 MG: 2 INJECTION INTRAMUSCULAR; INTRAVENOUS at 09:55

## 2021-01-01 RX ADMIN — PREGABALIN 100 MG: 100 CAPSULE ORAL at 09:16

## 2021-01-01 RX ADMIN — INDOMETHACIN 50 MG: 25 CAPSULE ORAL at 18:30

## 2021-01-01 RX ADMIN — INDOMETHACIN 50 MG: 25 CAPSULE ORAL at 12:51

## 2021-01-01 RX ADMIN — CIPROFLOXACIN 400 MG: 2 INJECTION, SOLUTION INTRAVENOUS at 08:22

## 2021-01-01 RX ADMIN — CIPROFLOXACIN 400 MG: 2 INJECTION, SOLUTION INTRAVENOUS at 08:50

## 2021-01-01 RX ADMIN — OXYCODONE HYDROCHLORIDE 10 MG: 10 TABLET ORAL at 09:16

## 2021-01-01 RX ADMIN — BACITRACIN ZINC 1 G: 500 OINTMENT TOPICAL at 13:01

## 2021-01-01 RX ADMIN — METRONIDAZOLE 500 MG: 500 INJECTION, SOLUTION INTRAVENOUS at 10:38

## 2021-01-01 RX ADMIN — CLINDAMYCIN PHOSPHATE 900 MG: 150 INJECTION, SOLUTION INTRAVENOUS at 12:57

## 2021-01-01 RX ADMIN — BACLOFEN 20 MG: 10 TABLET ORAL at 14:17

## 2021-01-01 RX ADMIN — OXYCODONE HYDROCHLORIDE 10 MG: 10 TABLET ORAL at 23:46

## 2021-01-01 RX ADMIN — ENOXAPARIN SODIUM 30 MG: 30 INJECTION SUBCUTANEOUS at 10:18

## 2021-01-01 RX ADMIN — MORPHINE SULFATE 4 MG: 4 INJECTION, SOLUTION INTRAMUSCULAR; INTRAVENOUS at 14:35

## 2021-01-01 RX ADMIN — OXYCODONE HYDROCHLORIDE 10 MG: 10 TABLET ORAL at 17:27

## 2021-01-01 RX ADMIN — PREGABALIN 100 MG: 100 CAPSULE ORAL at 08:58

## 2021-01-01 RX ADMIN — ENOXAPARIN SODIUM 30 MG: 30 INJECTION SUBCUTANEOUS at 12:40

## 2021-01-01 RX ADMIN — IOPAMIDOL 69 ML: 755 INJECTION, SOLUTION INTRAVENOUS at 13:09

## 2021-01-01 RX ADMIN — OXYCODONE HYDROCHLORIDE 10 MG: 10 TABLET ORAL at 00:20

## 2021-01-01 RX ADMIN — INSULIN LISPRO 3 UNITS: 100 INJECTION, SOLUTION INTRAVENOUS; SUBCUTANEOUS at 17:21

## 2021-01-01 RX ADMIN — POLYETHYLENE GLYCOL 3350 17 G: 17 POWDER, FOR SOLUTION ORAL at 10:44

## 2021-01-01 RX ADMIN — METRONIDAZOLE 500 MG: 500 INJECTION, SOLUTION INTRAVENOUS at 01:34

## 2021-01-01 RX ADMIN — DEXTROSE MONOHYDRATE 50 ML: 25 INJECTION, SOLUTION INTRAVENOUS at 12:59

## 2021-01-01 RX ADMIN — SODIUM CHLORIDE, PRESERVATIVE FREE 10 ML: 5 INJECTION INTRAVENOUS at 08:57

## 2021-01-01 RX ADMIN — CIPROFLOXACIN 400 MG: 2 INJECTION, SOLUTION INTRAVENOUS at 00:05

## 2021-01-01 RX ADMIN — INDOMETHACIN 50 MG: 25 CAPSULE ORAL at 10:19

## 2021-01-01 RX ADMIN — PREGABALIN 100 MG: 100 CAPSULE ORAL at 00:05

## 2021-01-01 ASSESSMENT — PAIN DESCRIPTION - PAIN TYPE
TYPE: ACUTE PAIN
TYPE: ACUTE PAIN;CHRONIC PAIN
TYPE: ACUTE PAIN
TYPE: CHRONIC PAIN
TYPE: ACUTE PAIN

## 2021-01-01 ASSESSMENT — PAIN SCALES - GENERAL
PAINLEVEL_OUTOF10: 9
PAINLEVEL_OUTOF10: 10
PAINLEVEL_OUTOF10: 8
PAINLEVEL_OUTOF10: 7
PAINLEVEL_OUTOF10: 9
PAINLEVEL_OUTOF10: 6
PAINLEVEL_OUTOF10: 8
PAINLEVEL_OUTOF10: 6
PAINLEVEL_OUTOF10: 6
PAINLEVEL_OUTOF10: 8
PAINLEVEL_OUTOF10: 9
PAINLEVEL_OUTOF10: 0
PAINLEVEL_OUTOF10: 7
PAINLEVEL_OUTOF10: 7
PAINLEVEL_OUTOF10: 9
PAINLEVEL_OUTOF10: 8
PAINLEVEL_OUTOF10: 9
PAINLEVEL_OUTOF10: 10
PAINLEVEL_OUTOF10: 7
PAINLEVEL_OUTOF10: 8
PAINLEVEL_OUTOF10: 7
PAINLEVEL_OUTOF10: 7
PAINLEVEL_OUTOF10: 9
PAINLEVEL_OUTOF10: 8
PAINLEVEL_OUTOF10: 6
PAINLEVEL_OUTOF10: 4

## 2021-01-01 ASSESSMENT — PAIN DESCRIPTION - DESCRIPTORS
DESCRIPTORS: ACHING
DESCRIPTORS: ACHING

## 2021-01-01 ASSESSMENT — PAIN - FUNCTIONAL ASSESSMENT: PAIN_FUNCTIONAL_ASSESSMENT: ACTIVITIES ARE NOT PREVENTED

## 2021-01-01 ASSESSMENT — PAIN DESCRIPTION - PROGRESSION
CLINICAL_PROGRESSION: NOT CHANGED
CLINICAL_PROGRESSION: GRADUALLY WORSENING
CLINICAL_PROGRESSION: GRADUALLY IMPROVING
CLINICAL_PROGRESSION: GRADUALLY WORSENING

## 2021-01-01 ASSESSMENT — PAIN DESCRIPTION - LOCATION
LOCATION: NECK;BACK
LOCATION: BACK;HIP;NECK
LOCATION: BACK
LOCATION: BACK;NECK
LOCATION: BACK;NECK;THROAT

## 2021-01-01 ASSESSMENT — PAIN DESCRIPTION - FREQUENCY
FREQUENCY: CONTINUOUS

## 2021-01-01 ASSESSMENT — PAIN DESCRIPTION - DIRECTION: RADIATING_TOWARDS: DOWN

## 2021-01-01 ASSESSMENT — PAIN DESCRIPTION - ORIENTATION
ORIENTATION: LOWER
ORIENTATION: POSTERIOR

## 2021-01-01 ASSESSMENT — PAIN DESCRIPTION - ONSET
ONSET: ON-GOING
ONSET: AWAKENED FROM SLEEP
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
## 2021-03-31 NOTE — PROGRESS NOTES
MA Rooming Questions  Patient: Lew Browne  MRN: [id number]    Date: 3/31/2021        1. Do you have any new issues?   no         2. Do you need any refills on medications?    no    3. Have you had any imaging done since your last visit?   no    4. Have you been hospitalized or seen in the emergency room since your last visit here?   yes - [facility]    5. Did the patient have a depression screening completed today?  No    No data recorded     PHQ-9 Given to (if applicable):               PHQ-9 Score (if applicable):                     [] Positive     []  Negative              Does question #9 need addressed (if applicable)                     [] Yes    []  No               Shine Sims MA

## 2021-04-01 NOTE — TELEPHONE ENCOUNTER
Spoke with pt regarding labs and explained the need to restart ferrous sulfate. Pt stated he already had some and didn't need a refill at this time.

## 2021-04-26 PROBLEM — K52.9 COLITIS: Status: ACTIVE | Noted: 2021-01-01

## 2021-04-26 NOTE — ED PROVIDER NOTES
2:05 PM EDT  Julieta Koenig was checked out to me by MEDINA Uriarte. Please see his/her initial documentation for details of the patient's ED presentation, physical exam and completed studies. At time of patient signout, case management evaluation pending. In brief, Cecile Reddy with past medical history of hypertension, diabetes mellitus, schizophrenia, bipolar 1 disorder presented with constellation of complaints and worsening pain. He endorses abdominal pain, sore throat, worsening chronic neck and back pain. He has been unable to get out of bed for the past 3 days due to his worsening pain and feels like he is not doing well at home on his own. At time of signout, work-up was completed, case management has been asked to be involved in patient case to determine if increased home health care placement is possible, if not the plan is to admit for PT/OT evaluation and likely placement as an end goal as patient is unable to care for himself at this point.     I have reviewed and interpreted all of the currently available lab/imaging results from this visit (if applicable):  LABS:  Results for orders placed or performed during the hospital encounter of 04/26/21   Strep Screen Group A Throat    Specimen: Throat   Result Value Ref Range    Specimen THROAT     Special Requests NONE     Strep A Direct Screen NEGATIVE    COVID-19, Rapid    Specimen: Nasopharyngeal   Result Value Ref Range    Source THROAT     SARS-CoV-2, NAAT NOT DETECTED NOT DETECTED   CBC auto diff   Result Value Ref Range    WBC 7.3 4.0 - 10.5 K/CU MM    RBC 4.93 4.6 - 6.2 M/CU MM    Hemoglobin 15.0 13.5 - 18.0 GM/DL    Hematocrit 43.5 42 - 52 %    MCV 88.2 78 - 100 FL    MCH 30.4 27 - 31 PG    MCHC 34.5 32.0 - 36.0 %    RDW 13.4 11.7 - 14.9 %    Platelets 92 (L) 579 - 440 K/CU MM    MPV 10.1 7.5 - 11.1 FL    Differential Type AUTOMATED DIFFERENTIAL     Segs Relative 81.7 (H) 36 - 66 %    Lymphocytes % 10.5 (L) 24 - 44 %    Monocytes % 6.4 (H) 0 - 4 %    Eosinophils % 0.4 0 - 3 %    Basophils % 0.5 0 - 1 %    Segs Absolute 6.0 K/CU MM    Lymphocytes Absolute 0.8 K/CU MM    Monocytes Absolute 0.5 K/CU MM    Eosinophils Absolute 0.0 K/CU MM    Basophils Absolute 0.0 K/CU MM    Nucleated RBC % 0.0 %    Total Nucleated RBC 0.0 K/CU MM    Total Immature Neutrophil 0.04 K/CU MM    Immature Neutrophil % 0.5 (H) 0 - 0.43 %   CMP   Result Value Ref Range    Sodium 134 (L) 135 - 145 MMOL/L    Potassium 4.1 3.5 - 5.1 MMOL/L    Chloride 100 99 - 110 mMol/L    CO2 26 21 - 32 MMOL/L    BUN 12 6 - 23 MG/DL    CREATININE 0.5 (L) 0.9 - 1.3 MG/DL    Glucose 66 (L) 70 - 99 MG/DL    Calcium 8.5 8.3 - 10.6 MG/DL    Albumin 4.1 3.4 - 5.0 GM/DL    Total Protein 6.6 6.4 - 8.2 GM/DL    Total Bilirubin 0.5 0.0 - 1.0 MG/DL    ALT 23 10 - 40 U/L    AST 23 15 - 37 IU/L    Alkaline Phosphatase 50 40 - 129 IU/L    GFR Non-African American >60 >60 mL/min/1.73m2    GFR African American >60 >60 mL/min/1.73m2    Anion Gap 8 4 - 16   Troponin   Result Value Ref Range    Troponin T <0.010 <0.01 NG/ML   Brain Natriuretic Peptide   Result Value Ref Range    Pro-BNP 9.93 <300 PG/ML   Lipase   Result Value Ref Range    Lipase 48 13 - 60 IU/L   Magnesium   Result Value Ref Range    Magnesium 2.1 1.8 - 2.4 mg/dl   TSH without Reflex   Result Value Ref Range    TSH, High Sensitivity 1.430 0.270 - 4.20 uIu/ml   Lactic Acid, Plasma   Result Value Ref Range    Lactate 1.6 0.4 - 2.0 mMOL/L   Urinalysis (Lab)   Result Value Ref Range    Color, UA YELLOW YELLOW    Clarity, UA CLEAR CLEAR    Glucose, Urine NEGATIVE NEGATIVE MG/DL    Bilirubin Urine NEGATIVE NEGATIVE MG/DL    Ketones, Urine NEGATIVE NEGATIVE MG/DL    Specific Gravity, UA 1.015 1.001 - 1.035    Blood, Urine NEGATIVE NEGATIVE    pH, Urine 7.0 5.0 - 8.0    Protein, UA NEGATIVE NEGATIVE MG/DL    Urobilinogen, Urine NEGATIVE 0.2 - 1.0 MG/DL    Nitrite Urine, Quantitative NEGATIVE NEGATIVE    Leukocyte Esterase, Urine NEGATIVE NEGATIVE    RBC, UA <1 0 - 3 /HPF    WBC, UA NONE SEEN 0 - 2 /HPF    Bacteria, UA NEGATIVE NEGATIVE /HPF    Mucus, UA RARE (A) NEGATIVE HPF    Trichomonas, UA NONE SEEN NONE SEEN /HPF   Urine Drug Screen   Result Value Ref Range    Cannabinoid Scrn, Ur UNCONFIRMED POSITIVE (A) NEGATIVE    Amphetamines NEGATIVE NEGATIVE    Cocaine Metabolite NEGATIVE NEGATIVE    Benzodiazepine Screen, Urine NEGATIVE NEGATIVE    Barbiturate Screen, Ur NEGATIVE NEGATIVE    Opiates, Urine NEGATIVE NEGATIVE    Phencyclidine, Urine NEGATIVE NEGATIVE    Oxycodone NEGATIVE NEGATIVE   CK   Result Value Ref Range    Total CK 72 38 - 174 IU/L   EKG 12 Lead   Result Value Ref Range    Ventricular Rate 91 BPM    Atrial Rate 91 BPM    P-R Interval 160 ms    QRS Duration 88 ms    Q-T Interval 344 ms    QTc Calculation (Bazett) 423 ms    P Axis 56 degrees    R Axis 46 degrees    T Axis 33 degrees    Diagnosis       Normal sinus rhythm  Normal ECG  When compared with ECG of 19-JUN-2020 10:51,  No significant change was found           IMAGING:  CT ABDOMEN PELVIS W IV CONTRAST Additional Contrast? None   Final Result   Prior cholecystectomy. Splenomegaly without focal splenic mass. The portal vein was enlarged but   patent with anterior abdominal wall varices indicating cirrhosis. Distal sigmoid colon wall thickening without mesenteric fat inflammation   compatible with low level sigmoid colitis. No mass, ascites or lymphadenopathy. XR CHEST PORTABLE   Final Result   Peribronchial cuffing, which can be seen in a setting of reactive air disease   or viral infection. MDM:    Please see previous providers note for complete history, exam.  Time of signout, patient is hemodynamically stable, afebrile, nontachycardic. He is tearful on evaluation, requiring multiple doses of pain medication. Intact neurological exam.  Does have left lower quadrant abdominal discomfort on exam and CT imaging is demonstrating possible sigmoid colitis. He has received dose of antibiotic here in the ED, Augmentin. He did initially have mild hypoglycemia at 66, treated with D50. Chest x-ray with peribronchial cuffing which can be seen with reactive airway disease or viral infection. , [facility], RN evaluates patient in the ED, unable to get placement to skilled nursing facility from the emergency department. He will need admission to have PT and OT evaluation, rapid Covid. He has required multiple doses of pain medication for abdominal and back pain, did not tolerate ambulation here in the ED to be able to safely discharge. Final Impression:  1. Colitis    2. Abdominal pain, unspecified abdominal location    3. Chronic back pain, unspecified back location, unspecified back pain laterality    4. Fatigue, unspecified type    5.  Decreased activities of daily living (ADL)        (Please note that portions of this note may have been completed with a voice recognition program. Efforts were made to edit the dictations but occasionally words are mis-transcribed.)    Isaías Veras, [address] [place] [address]  04/26/21 1254

## 2021-04-26 NOTE — ACP (ADVANCE CARE PLANNING)
Advance Care Planning     Advance Care Planning Activator (Inpatient)  Conversation Note      Date of ACP Conversation: 4/26/2021    Conversation Conducted with: Patient with Decision Making Capacity    ACP Activator: Estrella Jeffries 157 Decision Maker:     Current Designated Health Care Decision Maker:     Primary Decision Maker (Active): Romulo Triplett - Brother/Sister - [phone]  Click here to complete Healthcare Decision Makers including section of the Healthcare Decision Maker Relationship (ie \"Primary\")  Today we documented Decision Maker(s) consistent with ACP documents on file. Care Preferences    Ventilation: \"If you were in your present state of health and suddenly became very ill and were unable to breathe on your own, what would your preference be about the use of a ventilator (breathing machine) if it were available to you? \"      Would the patient desire the use of ventilator (breathing machine)?: no    \"If your health worsens and it becomes clear that your chance of recovery is unlikely, what would your preference be about the use of a ventilator (breathing machine) if it were available to you? \"     Would the patient desire the use of ventilator (breathing machine)?: No      Resuscitation  \"CPR works best to restart the heart when there is a sudden event, like a heart attack, in someone who is otherwise healthy. Unfortunately, CPR does not typically restart the heart for people who have serious health conditions or who are very sick. \"    \"In the event your heart stopped as a result of an underlying serious health condition, would you want attempts to be made to restart your heart (answer \"yes\" for attempt to resuscitate) or would you prefer a natural death (answer \"no\" for do not attempt to resuscitate)? \" no       [x] Yes   [] No   Educated Patient / Lopez Held regarding differences between Advance Directives and portable DNR orders.   PT IS A DNRCC, DISCUSSED THIS WITH HIM HE IS ADAMANT HE WANTS TO REMAIN \"DNRCC\"   Length of ACP Conversation in minutes:  15  Conversation Outcomes:  [x] ACP discussion completed  [] Existing advance directive reviewed with patient; no changes to patient's previously recorded wishes  [] New Advance Directive completed  [] Portable Do Not Rescitate prepared for Provider review and signature  [] POLST/POST/MOLST/MOST prepared for Provider review and signature      Follow-up plan:    [] Schedule follow-up conversation to continue planning  [] Referred individual to Provider for additional questions/concerns   [] Advised patient/agent/surrogate to review completed ACP document and update if needed with changes in condition, patient preferences or care setting    [] This note routed to one or more involved healthcare providers

## 2021-04-26 NOTE — CARE COORDINATION
CM consult per Elliott CHAVEZ to initiate discharge planning with this pt who came to ER today with c/o neck and back pain a chronic condition. CM Reviewed pt chart prior to visiting pt. Pt has [facility] Medicare, [facility] Dual. PCP Dr Mickey Gustafson. CM visited pt who is alert and oriented. Introduced self and reason for visit. Pt confirms he lives alone at [facility]. States he ambulates with a walker, and has diabetic neuropathy, uses a motorized w/c outside of apt. Pt tells me he is having acute neck and back pain of his chronic condition. Pt becomes tearful when I stated I was here to initiate discharge planning. Pt stated, \"I thought I was being Admitted\". Explained he was being admitted for his pain and would evaluate for possible SNF rehab placement. Pt was agreeable to this plan. Explained I would provide him with a List of SNF choices pt stated he has been to SNF before but it was in [facility], no interested in going there wants a place in [facility], did not want the list, he wants CM to help him, states he I not familiar with the facilities in [facility]. Update to [facility] who has taken over for [facility]. Reminded to update hospitalist of PT/OT and COVID for possible SNF rehab. ACP completed with pt at bedside, documented in pt chart.  Pt wants to remain a [facility].  APOLONIA,RN/CM

## 2021-04-26 NOTE — ED PROVIDER NOTES
EMERGENCY DEPARTMENT ENCOUNTER      PCP: CAROLANN Dupont NP    CHIEF COMPLAINT    Chief Complaint   Patient presents with    Fatigue     hasnt been out of bed in 3 days d/t exacerbation of chronic back pain    Pharyngitis     hasnt been able to take some of his meds or eating well d/t sore throat    Abdominal Pain     EMS reports his abd appears slightly distended and possibly red where pt injects his insulin       Of note, this patient was not evaluated by attending physician, attending physician was available for consultation. TL Mascorro is a 72 y.o. male who presents to the emergency department today via EMS with numerous symptoms. Patient states that his landlord called EMS because no one had seen or heard from the last 2 or 3 days. He states that he has been laid up in his bed with worsening upper neck and back pain that he states is chronic. He denies any new injury or traumas. He states that his pain was so severe that he had difficulty getting up and ambulating. He was able to make it to his bathroom which is close to his bedroom. He also stating that he is having chest pain, left lower quadrant abdominal pain and is complaining of a sore throat and a cough. He denies fever or chills. States that he is always chronically short of breath secondary to his smoking habit. He denies any vomiting, no significant change in his bowel habits. Denies urinary symptoms. No bowel or bladder incontinence, radicular weakness or saddle anesthesias. Patient has had history of chronic back and neck issues, he has had trouble getting in the pain management. He also has a history of hypertension, diabetic, chronic anemia, hepatitis C and schizophrenia. REVIEW OF SYSTEMS    Constitutional: Admits to fatigue, generalized malaise.   No fever or chills  HENT:  Denies sore throat or ear pain   Cardiovascular: Denies chest pain, no palpitations, no near syncope/syncope, denies palpitations  Respiratory:  Admits cough and shortness of breath  GI: Admits lower left quadrant abdominal pain  :  Denies any urinary symptoms. Musculoskeletal: See HPI  Skin:  Denies rash  Neurologic:  Denies headache, focal weakness or sensory changes   Endocrine:  Denies polyuria or polydypsia   Lymphatic:  Denies swollen glands   All other review of systems are negative  See HPI and nursing notes for additional information     PAST MEDICAL AND SURGICAL HISTORY    Past Medical History:   Diagnosis Date    Arthritis     [facility]     Chronic airway obstruction, not elsewhere classified 10/28/2013    Chronic major depressive disorder, recurrent episode ([facility]     Diabetes mellitus ([facility]     GERD (gastroesophageal reflux disease)     Hepatitis C     Hypertension     Low back pain     Post traumatic stress disorder     Schizo affective schizophrenia ([facility]     per old chart pt in ER 7/28/2017- aggressive behavior at [facility]- and in ER- transferred to [facility] [place]    Schizophrenia [facility]) paranoid     History reviewed. No pertinent surgical history.     CURRENT MEDICATIONS    Current Outpatient Rx   Medication Sig Dispense Refill    Ferrous Sulfate (IRON) 325 (65 Fe) MG TABS TAKE ONE (1) TABLET BY MOUTH DAILY 90 tablet 0    prazosin (MINIPRESS) 5 MG capsule Take 2 capsules by mouth nightly 180 capsule 0    hydroxychloroquine (PLAQUENIL) 200 MG tablet       oxyCODONE HCl (OXY-IR) 10 MG immediate release tablet       NARCAN 4 MG/0.1ML LIQD nasal spray       ARIPiprazole (ABILIFY) 20 MG tablet Take 1 tablet by mouth daily 90 tablet 0    atorvastatin (LIPITOR) 10 MG tablet Take 1 tablet by mouth daily 90 tablet 0    calcitRIOL (ROCALTROL) 0.25 MCG capsule Take 1 capsule by mouth daily Indications: supplement 90 capsule 0    INSULIN SYRINGE 1CC/29G ([employer] INS SYRINGE 1CC/29G) 29G X 1/2\" 1 ML MISC 1 each by Does not apply route daily 200 each 5    lisinopril (PRINIVIL;ZESTRIL) 5 MG tablet Take 1 tablet by mouth daily 90 tablet 0    Melatonin 10 MG TABS Take 1 tablet by mouth nightly 90 tablet 0    insulin aspart (NOVOLOG) 100 UNIT/ML injection vial Inject 30 Units into the skin 3 times daily (before meals) 3 vial 5    insulin glargine (LANTUS) 100 UNIT/ML injection vial Inject 85 Units into the skin 2 times daily 5 vial 5    Multiple Vitamin (DAILY WILLEM) TABS Take 1 tablet by mouth daily Indications: supplement 90 tablet 0    etodolac (LODINE) 500 MG tablet Take 1 tablet by mouth 2 times daily 60 tablet 0    Blood Pressure KIT 1 kit by Does not apply route daily 1 kit 0    albuterol sulfate HFA (PROAIR HFA) 108 (90 Base) MCG/ACT inhaler Inhale 2 puffs into the lungs every 6 hours as needed for Wheezing      Polyvinyl Alcohol-Povidone (REFRESH OP) Apply to eye      methadone (DOLOPHINE) 5 MG tablet Take 5 mg by mouth every 12 hours as needed for Pain.  baclofen (LIORESAL) 20 MG tablet Take 20 mg by mouth 3 times daily      pregabalin (LYRICA) 100 MG capsule Take 100 mg by mouth 3 times daily. ALLERGIES    Allergies   Allergen Reactions    Latex     Acetaminophen      endstage liver disease    Bee Venom     Haldol [Haloperidol Lactate]     Nsaids      End stage liver disease    Nuts [Peanut-Containing Drug Products]     Other Swelling     insects       SOCIAL AND FAMILY HISTORY    Social History     Socioeconomic History    Marital status:       Spouse name: None    Number of children: None    Years of education: None    Highest education level: None   Occupational History    None   Social Needs    Financial resource strain: None    Food insecurity     Worry: None     Inability: None    Transportation needs     Medical: None     Non-medical: None   Tobacco Use    Smoking status: Current Every Day Smoker     Packs/day: 1.00     Types: Cigarettes    Smokeless tobacco: Never Used   Substance and Sexual Activity    Alcohol use: No    Drug use: No    Sexual activity: Not Currently   Lifestyle    Physical activity     Days per week: None     Minutes per session: None    Stress: None   Relationships    Social connections     Talks on phone: None     Gets together: None     Attends Religion service: None     Active member of club or organization: None     Attends meetings of clubs or organizations: None     Relationship status: None    Intimate partner violence     Fear of current or ex partner: None     Emotionally abused: None     Physically abused: None     Forced sexual activity: None   Other Topics Concern    None   Social History Narrative    None     Family History   Problem Relation Age of Onset    Diabetes Mother     Cancer Mother     Diabetes Father     Cancer Father     Cancer Brother          PHYSICAL EXAM    VITAL SIGNS: /73   Pulse 90   Temp 98.3 °F (36.8 °C) (Oral)   Resp 18   Ht 5' 6\" (1.676 m)   Wt 190 lb (86.2 kg)   SpO2 96%   BMI 30.67 kg/m²    Constitutional:  Well developed, Well nourished. No distress  HENT:  Normocephalic, Atraumatic, PERRL. EOMI. Sclera clear. Conjunctiva normal, No discharge. Oropharynx is within normal limits, no significant erythema, tonsil hypertrophy no posterior exudate. No voice changes. No stridor. No excessive drooling  Neck/Lymphatics: supple, no JVD, no swollen nodes  Cardiovascular:  RRR,  no murmurs/rubs/gallops. No JVD  No carotid bruits or murmurs heard in carotids. Respiratory:  Nonlabored breathing. Normal breath sounds, No wheezing  Abdomen: Bowel sounds normal, Soft left lower quadrant abdominal tenderness, no pulsatile masses. Musculoskeletal:    BACK: There is not thoracic or lumbar midline tenderness to palpation or step-offs. Paraspinal tenderness to palpation is  present in the paralumbar region. No overlying rashes. LE strength is 5/5. LE light touch is intact. LE DTR's are 2+ in the patellas and achilles.  Straight leg test is negative on the RIGHT, negative on the LEFT. There is no edema, asymmetry, or calf / thigh tenderness bilaterally. No cyanosis. No cool or pale-appearing limb. Distal cap refill and pulses intact bilateral upper and lower extremities  Bilateral upper and lower extremity ROM intact without pain or obvious deficit  Integument: Erythema to the left lower quadrant of the abdominal area no induration no obvious abscess. Neurologic: Alert & oriented , No focal deficits noted. Cranial nerves II through XII grossly intact. Normal gross motor coordination & motor strength bilateral upper and lower extremities  Sensation intact. Psychiatric:  Affect normal, Mood normal.     Labs:  Results for orders placed or performed during the hospital encounter of 04/26/21   Lactic Acid, Plasma   Result Value Ref Range    Lactate 1.6 0.4 - 2.0 mMOL/L   EKG 12 Lead   Result Value Ref Range    Ventricular Rate 91 BPM    Atrial Rate 91 BPM    P-R Interval 160 ms    QRS Duration 88 ms    Q-T Interval 344 ms    QTc Calculation (Bazett) 423 ms    P Axis 56 degrees    R Axis 46 degrees    T Axis 33 degrees    Diagnosis       Normal sinus rhythm  Normal ECG  When compared with ECG of 19-JUN-2020 10:51,  No significant change was found             EKG    See supervising physicians note for EKG interpretation. RADIOLOGY    Ct Abdomen Pelvis W Iv Contrast Additional Contrast? None    Result Date: 4/26/2021  EXAMINATION: CT OF THE ABDOMEN AND PELVIS WITH CONTRAST 4/26/2021 1:10 pm TECHNIQUE: CT of the abdomen and pelvis was performed with the administration of intravenous contrast. Multiplanar reformatted images are provided for review. Dose modulation, iterative reconstruction, and/or weight based adjustment of the mA/kV was utilized to reduce the radiation dose to as low as reasonably achievable. COMPARISON: 06/16/2018.  HISTORY: ORDERING SYSTEM PROVIDED HISTORY: abdominal pain TECHNOLOGIST PROVIDED HISTORY: Reason for exam:->abdominal pain Additional Contrast?->None Decision Support Exception->Emergency Medical Condition (MA) Reason for Exam: abd pain;nausea;fatigue Acuity: Acute Type of Exam: Initial Additional signs and symptoms: pharyngitis,diarrhea FINDINGS: Lower Chest: Scattered coalesced alveoli were noted. No lung base consolidation, lung base mass or pleural effusion were noted. Coronary artery calcifications were seen. Organs: Metallic surgical clips were noted from prior cholecystectomy. The liver, both adrenals and pancreas appeared normal.  Splenomegaly was noted without focal splenic mass. No renal mass, hydronephrosis or ureteral calculi were identified. Several sub mm calculi were noted in each kidney. GI/Bowel: Distal sigmoid colon wall thickening was noted. No appendicitis was noted. No mesenteric fat inflammation was seen. Pelvis: Uniform wall thickening of the urinary bladder was noted. The prostate was enlarged. No ascites seen. Peritoneum/Retroperitoneum: No abdominal or pelvic lymphadenopathy were noted. The portal vein was enlarged but patent. Bones/Soft Tissues: An umbilical hernia was noted which contains an enlarged umbilical vein. The hernia measured 3 cm. Adjacent anterior abdominal wall varices were noted. Acquired spinal stenosis was noted at the L3-4 and L4-5 disc. Marked decrease in disc height was noted at the L5-S1 disc. Prior cholecystectomy. Splenomegaly without focal splenic mass. The portal vein was enlarged but patent with anterior abdominal wall varices indicating cirrhosis. Distal sigmoid colon wall thickening without mesenteric fat inflammation compatible with low level sigmoid colitis. No mass, ascites or lymphadenopathy. Xr Chest Portable    Result Date: 4/26/2021  EXAMINATION: ONE XRAY VIEW OF THE CHEST 4/26/2021 11:45 am COMPARISON: 06/16/2018.  HISTORY: ORDERING SYSTEM PROVIDED HISTORY: Chest pain TECHNOLOGIST PROVIDED HISTORY: Reason for exam:->Chest pain Reason for Exam: Chest pain Acuity: Acute Type of Exam: Initial FINDINGS: The cardiac silhouette appears within normal limits. There is peribronchial cuffing, which can be seen in a setting of reactive air disease or viral infection without confluent airspace opacity seen. No large pleural effusion or pneumothorax is seen. Peribronchial cuffing, which can be seen in a setting of reactive air disease or viral infection. ED COURSE & MEDICAL DECISION MAKING     Patient presents as above. Emergent etiologies considered. Patient presenting via EMS with a constellation of symptoms, states that he has been unable to get out of his bed for the last 2 or 3 days secondary to his chronic neck and back pain he also states has been having chest pain intermittent shortness of breath a cough, sore throat, complaining of left lower quadrant abdominal pain. No change in bowel habits no nausea or vomiting. No alarming symptoms with his back pain, no bowel or bladder incontinence saddle anesthesia or radicular weakness. Overall he does appear well, no airway compromise, tolerating his own secretions no voice changes, posterior pharynx within normal limits. Heart lung sounds well, no adventitious sounds no wheezing. He is in no distress. Does have left lower quadrant abdominal pain an area of mild erythema where his injection of his insulin. Patient is neurologically intact in the lower extremity does have general back pain but no significant alarming symptoms. Does have chronic pain    A broad work-up was initiated secondary to his presenting symptoms and physical exam findings. Patient's work-up demonstrating sigmoid colitis without signs of perforation or complication. There is also evidence of possible compression fracture to the L5-S1 vertebrae which is likely causing his exacerbation of pain. Patient blood sugar was around 66, we did give him dextrose, otherwise his blood work was unremarkable. Negative Covid and rapid strep testing.   Cardiac testing demonstrated no significant changes, EKG demonstrating no significant ischemic changes or life-threatening arrhythmias, troponin BNP negative. Chest x-ray did show some peribronchial cuffing which is unclear etiology at this point. On reevaluation patient still having significant pain unable to sit up. Spoke with him about the colitis at this point we will treat him with oral Augmentin, he states that he is having difficulty doing anything at home. We will get case management involved, will give another dose of pain medication, potential placement into acute rehab or admission for pain control? And treatment for his colitis    ________________________________________________________________________    2:00 PM EDT I have signed out [facility] care to Elaine Murphy PA-C   Bedside hand-off performed. We discussed the history, physical exam, completed/pending test results (if obtained) and current treatment plan. At time of patient signout Case management consultation and disposition pending.   - If  negative for abnormality or obvious etiology, patient will be discharged with close outpatient follow-up and return to emergency department warning signs.   - If   reveals any abnormalities, these will be addressed by Elaine Murphy and supervising M.D. In this case, see his/her note for further details, remaining Emergency Department course, final disposition and diagnosis. ________________________________________________________________________      Comment: Please note this report has been produced using speech recognition software and may contain errors related to that system including errors in grammar, punctuation, and spelling, as well as words and phrases that may be inappropriate. If there are any questions or concerns please feel free to contact the dictating provider for clarification.            Ester Hernandez [address], PA  04/27/21 1015

## 2021-04-26 NOTE — H&P
History and Physical      Name:  Lino Mcburney /Age/Sex: 1955  (72 y.o. male)   MRN & CSN:  [id number] & [id number] Admission Date/Time: 2021 11:17 AM   Location:  ED20/ED-20 PCP: CAROLANN Lemon NP       Hospital Day: 1    Assessment and Plan:   73 y/o M with PMH hypertension, hepatitis C infection, diabetes mellitus, depression, COPD, depression, cervical stenosis and rheumatoid arthritis that is presenting with back and abdominal pain. Back Pain  Hx of Rheumatoid Arthritis  Hx of Cervical Stenosis/Degenerative Disk Disease  - Case d/w ED; still pain despite pain meds and will require SNF so CM consulted in ED  - EKG reviewed by myself with no ischemic changes  - CXR reviewed showing bronchial cuffing; patient currently asymptomatic  - Will continue home pain meds with Dilaudid PRN  - CT C/T/L spine ordered; will f/u results  - PT/OT  - Hydroxychloroquine restarted    Sigmoid Colitis  - CT reviewed showing mild sigmoid colitis  - Cipro/Flagyl  - Will monitor    Hx HTN  - Lisinopril restarted    DM  - Home Lantus at lower dosing restarted  - SSI  - Hypoglycemia protocol    COPD  - Albuterol inhaler PRN    Depression  - Abilify reordered      Diet No diet orders on file   DVT Prophylaxis [] Lovenox, []  Heparin, [] SCDs, [] Ambulation   GI Prophylaxis [] PPI,  [] H2 Blocker,  [] Carafate,  [] Diet/Tube Feeds   Code Status Prior   Disposition Patient requires continued admission due to    MDM [] Low, [] Moderate,[]  High  Patient's risk as above due to      History of Present Illness:   73 y/o M with PMH hypertension, hepatitis C infection, diabetes mellitus, depression, COPD, depression, cervical stenosis and rheumatoid arthritis that is presenting with back and abdominal pain. Patient has a history of chronic back pain that started about 3 days ago. States that he has not been able to get out of bed since the pain started. States that this pain is worse than his baseline.   No urinary incontinence, fevers, or chills. No paresthesias. Follows with Rheumatology. Ten point ROS reviewed negative, unless as noted above. In ED patient was AFVSS. Labs unremarkable. CT A/P shows colitis and splenomegaly. Objective:   No intake or output data in the 24 hours ending 04/26/21 1640   Vitals:   Vitals:    04/26/21 1601   BP: 116/61   Pulse: 87   Resp: 18   Temp:    SpO2: 97%     Physical Exam:   GEN Awake male, sitting upright in bed in moderate distress. Appears given age. EYES Pupils are equally round. No scleral erythema, discharge, or conjunctivitis. HENT Mucous membranes are moist  NECK Supple, no apparent thyromegaly or masses. RESP Clear to auscultation, no wheezes, rales or rhonchi. Symmetric chest movement while on room air. CARDIO/VASC S1/S2 auscultated. Regular rate without appreciable murmurs, rubs, or gallops. GI Abdomen is soft without significant tenderness, masses, or guarding   No costovertebral angle tenderness. Normal appearing external genitalia  HEME/LYMPH No palpable cervical lymphadenopathy and no hepatosplenomegaly. No petechiae or ecchymoses. MSK No gross joint deformities, CTL spine TTP  SKIN Normal coloration, warm, dry. NEURO Cranial nerves appear grossly intact, normal speech  PSYCH Awake, alert, oriented x 4. Affect appropriate.     Past Medical History:      Past Medical History:   Diagnosis Date    Arthritis     Bipolar 1 disorder ([address]     Chronic airway obstruction, not elsewhere classified 10/28/2013    Chronic major depressive disorder, recurrent episode ([address]     Diabetes mellitus ([address]     GERD (gastroesophageal reflux disease)     Hepatitis C     Hypertension     Low back pain     Post traumatic stress disorder     Schizo affective schizophrenia ([address]     per old chart pt in ER 7/28/2017- aggressive behavior at [facility]- and in ER- transferred to [address]) paranoid     PSHX:  has no past surgical history on file. Allergies: Allergies   Allergen Reactions    Latex     Acetaminophen      endstage liver disease    Bee Venom     Haldol [Haloperidol Lactate]     Nsaids      End stage liver disease    Nuts [Peanut-Containing Drug Products]     Other Swelling     insects       FAM HX: family history includes Cancer in his brother, father, and mother; Diabetes in his father and mother. Soc HX:   Social History     Socioeconomic History    Marital status:       Spouse name: None    Number of children: None    Years of education: None    Highest education level: None   Occupational History    None   Social Needs    Financial resource strain: None    Food insecurity     Worry: None     Inability: None    Transportation needs     Medical: None     Non-medical: None   Tobacco Use    Smoking status: Current Every Day Smoker     Packs/day: 1.00     Types: Cigarettes    Smokeless tobacco: Never Used   Substance and Sexual Activity    Alcohol use: No    Drug use: No    Sexual activity: Not Currently   Lifestyle    Physical activity     Days per week: None     Minutes per session: None    Stress: None   Relationships    Social connections     Talks on phone: None     Gets together: None     Attends Holiness service: None     Active member of club or organization: None     Attends meetings of clubs or organizations: None     Relationship status: None    Intimate partner violence     Fear of current or ex partner: None     Emotionally abused: None     Physically abused: None     Forced sexual activity: None   Other Topics Concern    None   Social History Narrative    None       Medications:   Medications:    Infusions:   PRN Meds:       Electronically signed by Elmo Saha MD on 4/26/2021 at 4:40 PM

## 2021-04-26 NOTE — ED TRIAGE NOTES
Pt to ED today for c/o severe pain to his neck, back, and throat. Pt states he has chronic neck and back pain and is trying to get into pain management but hasnt been able to get an appt yet. Pt reports he hasnt been out of bed in 3 days d/t pain. Pt also c/o chest pain, sore throat, and abd \"distention\". Pt states he \"cant take the pain anymore\".  Pt from home and ambulates with a cane usually

## 2021-04-26 NOTE — ED PROVIDER NOTES
EKG is interpreted by me. EKG shows sinus rhythm at 91 bpm, Axis is nondeviated, no Francis aggravations or depressions, T waves are unremarkable, MS interval 160, QRS ration of 88, QTc of 123.  5 impression, nonspecific EKG.     Carmine Tavares  4/26/2021  12:13 PM       Carmine Tavares MD  04/26/21 121

## 2021-04-27 NOTE — CARE COORDINATION
Chart reviewed and met w/ pt to follow up on ED CM conversation w/ pt and discharge planning. CM introduced self and explained role. Pt is alert and oriented. Pt states he lives alone in an apartment at [facility]. CM discussed therapy recommendations of SNF with him. Pt initially was interested in SNF but then stated he would prefer to just go home. CM discussed possible HHC with pt and he declined that also. Pt stated he could do his own therapy when he got home. Pt states he uses the [facility] for transport. CM added community resources for transportation into pts discharge instructions. Pt declined any other needs at this time from CM. CM available should needs present. Perfect serve sent to Dr. Rod Atkinson with update.
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
## 2021-04-27 NOTE — PROGRESS NOTES
[address] [place] [address]  HOSPITALIST PROGRESS NOTE                       Name:  Little Simons /Age/Sex: 1955  (72 y.o. male)   MRN & CSN:  [id number] & [id number] Admission Date/Time: 2021 11:17 AM   Location:  [address] [place] [address] Attending:  Prasad Carrillo MD                                                  HPI  Little Simons is a 72 y.o. male who presents with multiple complaints- myalgias, back pain and abdominal pain    SUBJECTIVE  Very unhappy- reports he aches everywhere especially all muscles and joints, thinks he is very sweaty and generally not feeling good. No fever documented. 10 point review of systems reviewed and negative unless noted above. ALLERGIES:   Allergies   Allergen Reactions    Latex     Acetaminophen      endstage liver disease    Bee Venom     Haldol [Haloperidol Lactate]     Nsaids      End stage liver disease    Nuts [Peanut-Containing Drug Products]     Other Swelling     insects       PCP: CAROLANN Sahni NP    PAST MEDICAL HISTORY, SURGICAL HISTORY, SOCIAL HISTORY and  HOME MEDICATIONS all reviewed. OBJECTIVE  Vitals:    21 1911 21 2030 21 0515 21 0829   BP: (!) 117/56 118/63 120/68 133/82   Pulse: 91 86 90 107   Resp: 16 16 16 16   Temp: 98.7 °F (37.1 °C) 98.2 °F (36.8 °C) 98.2 °F (36.8 °C) 98.8 °F (37.1 °C)   TempSrc: Oral Oral Oral Oral   SpO2: 95% 95% 96% 94%   Weight:       Height:           PHYSICAL EXAM   GEN Awake male, sitting upright in bed in no apparent distress. Appears given age. EYES Pupils are equally round. No scleral erythema, discharge, or conjunctivitis. HENT Mucous membranes are moist. Oral pharynx without exudates, no evidence of thrush. NECK Supple, no apparent thyromegaly or masses. RESP Clear to auscultation, no wheezes, rales or rhonchi. Symmetric chest movement while on room air. CARDIO/VASC S1/S2 auscultated. Regular rate without appreciable murmurs, rubs, or gallops. No JVD or carotid bruits. Peripheral pulses equal bilaterally and palpable. No peripheral edema. GI Abdomen is soft without significant tenderness, masses, or guarding. Bowel sounds are normoactive. Rectal exam deferred.  No costovertebral angle tenderness. Normal appearing external genitalia  HEME/LYMPH No palpable cervical lymphadenopathy and no hepatosplenomegaly. No petechiae or ecchymoses. MSK Spontaneous movement of all extremities. No gross joint deformities. SKIN Normal coloration, warm, dry. NEURO Cranial nerves appear grossly intact, has mild weakness on the lower extremities but says that is normal.      INTAKE: In: 130 [I.V.:10]  Out: 575   OUTPUT: In: 130   Out: 575 [Urine:575]    LABS  Recent Labs     04/26/21  1200 04/27/21  0611   WBC 7.3 8.3   HGB 15.0 15.3   HCT 43.5 46.2   PLT 92* 94*      Recent Labs     04/26/21  1200 04/27/21  0611   * 133*   K 4.1 4.4    99   CO2 26 28   BUN 12 10   CREATININE 0.5* 0.6*     Recent Labs     04/26/21  1200   AST 23   ALT 23   BILITOT 0.5   ALKPHOS 50     No results for input(s): INR in the last 72 hours.   Recent Labs     04/26/21  1200   CKTOTAL 67   TROPONINT <0.010          Abnormal labs for today noted      Imaging:     ECHO:    Microbiology:  Blood culture:    Urine culture:    Sputum culture:    Procedures done this admission:    MEDS  Scheduled Meds:   nicotine  1 patch Transdermal Daily    baclofen  20 mg Oral TID    hydroxychloroquine  200 mg Oral Daily    insulin glargine  60 Units Subcutaneous BID    insulin lispro  0-12 Units Subcutaneous TID     insulin lispro  0-6 Units Subcutaneous Nightly    lisinopril  5 mg Oral Daily    pregabalin  100 mg Oral TID    sodium chloride flush  5-40 mL Intravenous 2 times per day    enoxaparin  30 mg Subcutaneous Daily    ciprofloxacin  400 mg Intravenous Q12H    metroNIDAZOLE  500 mg Intravenous Q8H    ARIPiprazole  20 mg Oral Daily     Continuous Infusions:   dextrose      sodium chloride       PRN Meds:diphenhydrAMINE, albuterol sulfate HFA, glucose, dextrose, glucagon (rDNA), dextrose, oxyCODONE HCl, HYDROmorphone, sodium chloride flush, sodium chloride, promethazine **OR** ondansetron, polyethylene glycol        ASSESSMENT and [facility]: 2      1-Polyarthralgia/polymyalgia- in the setting of RA-with constitutional symptoms-  check ESR and CRP- recently restarted plaquenil, d/w pharmacy has been getting indocin too- will restart it  2-Chronic back pain with chronic pain syndrome- reports used to follow up with pain management- but lost follow up and now having difficulty to get one. CTs noted- with L5-S1 moderate to severe disc disease, given CT C spine findings will get MRI of C spine.   3-?Colitis- with diarrhea- check C diff, continue cipro and flagyl, due to constitutional symptoms- check COVID    Other issues    Hx HTN  - Lisinopril restarted     DM  - Home Lantus at lower dosing restarted  - SSI  - Hypoglycemia protocol     COPD  - Albuterol inhaler PRN     Depression  - Abilify reordered        -will need placement for rehab- PT/OT and CM    Disp:     Diet DIET CARB CONTROL;   DVT Prophylaxis [] Lovenox, []  Heparin, [] SCDs, [] Ambulation   GI Prophylaxis [] PPI,  [] H2 Blocker,  [] Carafate,  [] Diet/Tube Feeds   Code Status Full Code   Disposition Patient requires continued admission due to debility   CMS Level of Risk [] Low, [x] Moderate,[]  High  Patient's risk as above due to debility     RON FORD MD 4/27/2021 9:06 AM

## 2021-04-27 NOTE — PROGRESS NOTES
Complained of itching after second dose of IV Flagyl. Request benadryl for itching. No visible rash. Patient is fair skinned with red tent to skin. Visualized some scratching. Message to Paula Crum. Ordered Benadryl. Requested more frequent pain medication. Declined to order at this time.

## 2021-04-28 NOTE — PROGRESS NOTES
[address] [place] [address]  HOSPITALIST PROGRESS NOTE                       Name:  Jenny Mckenzie /Age/Sex: 1955  (72 y.o. male)   MRN & CSN:  [id number] & [id number] Admission Date/Time: 2021 11:17 AM   Location:  [address] [place] [address] Attending:  Natalie Gonzalez MD                                                  HPI  Jenny Mckenzie is a 72 y.o. male who presents with multiple complaints- myalgias, back pain and abdominal pain    SUBJECTIVE  Very irritable today, lashes out whenever asked any question, withdrawn and hostile. Continues to complain of pain intermittently. 10 point review of systems reviewed and negative unless noted above. ALLERGIES:   Allergies   Allergen Reactions    Latex     Acetaminophen      endstage liver disease    Bee Venom     Haldol [Haloperidol Lactate]     Nsaids      End stage liver disease    Nuts [Peanut-Containing Drug Products]     Other Swelling     insects       PCP: CAROLANN Little NP    PAST MEDICAL HISTORY, SURGICAL HISTORY, SOCIAL HISTORY and  HOME MEDICATIONS all reviewed. OBJECTIVE  Vitals:    21 1620 21 2345 21 0453 21 0809   BP: 130/80 137/64 (!) 100/59 (!) 117/57   Pulse: 98 91 89 88   Resp: 18 18 16 16   Temp: 98 °F (36.7 °C) 98.4 °F (36.9 °C) 98.5 °F (36.9 °C) 98.1 °F (36.7 °C)   TempSrc: Oral Oral Oral Oral   SpO2: 97%  92% 93%   Weight:       Height:           PHYSICAL EXAM   GEN Awake male, sitting upright in bed in no apparent distress. Appears given age. EYES Pupils are equally round. No scleral erythema, discharge, or conjunctivitis. HENT Mucous membranes are moist. Oral pharynx without exudates, no evidence of thrush. NECK Supple, no apparent thyromegaly or masses. RESP Clear to auscultation, no wheezes, rales or rhonchi. Symmetric chest movement while on room air. CARDIO/VASC S1/S2 auscultated. Regular rate without appreciable murmurs, rubs, or gallops. No JVD or carotid bruits.  Peripheral pulses equal bilaterally and palpable. No peripheral edema. GI Abdomen is soft without significant tenderness, masses, or guarding. Bowel sounds are normoactive. Rectal exam deferred.  No costovertebral angle tenderness. Normal appearing external genitalia  HEME/LYMPH No palpable cervical lymphadenopathy and no hepatosplenomegaly. No petechiae or ecchymoses. MSK Spontaneous movement of all extremities. No gross joint deformities. SKIN Normal coloration, warm, dry. NEURO Cranial nerves appear grossly intact, has mild weakness on the lower extremities but says that is normal.      INTAKE: In: 480 [P.O.:480]  Out: 925   OUTPUT: In: 480   Out: 925 [Urine:925]    LABS  Recent Labs     04/26/21  1200 04/27/21  0611 04/28/21  0420   WBC 7.3 8.3 10.7*   HGB 15.0 15.3 15.1   HCT 43.5 46.2 44.9   PLT 92* 94* 103*      Recent Labs     04/26/21  1200 04/27/21  0611 04/28/21  0420   * 133* 131*   K 4.1 4.4 4.4    99 97*   CO2 26 28 24   PHOS  --   --  4.1   BUN 12 10 20   CREATININE 0.5* 0.6* 0.6*     Recent Labs     04/26/21  1200   AST 23   ALT 23   BILITOT 0.5   ALKPHOS 50     No results for input(s): INR in the last 72 hours.   Recent Labs     04/26/21  1200   CKTOTAL 67   TROPONINT <0.010          Abnormal labs for today noted      Imaging:     ECHO:    Microbiology:  Blood culture:    Urine culture:    Sputum culture:    Procedures done this admission:    MEDS  Scheduled Meds:   nicotine  1 patch Transdermal Daily    indomethacin  50 mg Oral TID WC    insulin glargine  50 Units Subcutaneous Nightly    insulin lispro  0-18 Units Subcutaneous TID WC    insulin lispro  0-18 Units Subcutaneous Nightly    insulin lispro  20 Units Subcutaneous TID WC    baclofen  20 mg Oral TID    hydroxychloroquine  200 mg Oral Daily    lisinopril  5 mg Oral Daily    pregabalin  100 mg Oral TID    sodium chloride flush  5-40 mL Intravenous 2 times per day    enoxaparin  30 mg Subcutaneous Daily    ARIPiprazole  20 mg Oral Daily     Continuous Infusions:   dextrose      sodium chloride       PRN Meds:diphenhydrAMINE, albuterol sulfate HFA, glucose, dextrose, glucagon (rDNA), dextrose, oxyCODONE HCl, sodium chloride flush, sodium chloride, promethazine **OR** ondansetron, polyethylene glycol        ASSESSMENT and [facility]: 3      1-Polyarthralgia/polymyalgia- in the setting of RA-with constitutional symptoms reported on presentation- no significantly elevation of ESR and CRP- on plaquenil, started on indocin- less complains of pain today. 2-Chronic back pain with chronic pain syndrome- reports used to follow up with pain management- but lost follow up and now having difficulty to get one. CTs of spines noted- with L5-S1 moderate to severe disc disease, says cannot get MRI due to metal in his neck- will have NS give an input  -per [facility] no narcotic refill since November 2020- reports lost in follow up and wants to establish care with new pain management. 3-?Colitis per CT - reported diarrhea on admission and now says he is constipated- stop IV abx and monitor clinically.    4-Psych disorder- has had several inpatient psych treatment- withdrawn and with hostile behavior- consult psych    Other issues  -HTN  -DM  -COPD  -Depression      Disp:     Diet DIET CARB CONTROL;   DVT Prophylaxis [] Lovenox, []  Heparin, [] SCDs, [] Ambulation   GI Prophylaxis [] PPI,  [] H2 Blocker,  [] Carafate,  [] Diet/Tube Feeds   Code Status Full Code   Disposition Patient requires continued admission due to debility   CMS Level of Risk [] Low, [x] Moderate,[]  High  Patient's risk as above due to debility     RON FORD MD 4/28/2021 12:09 PM

## 2021-04-28 NOTE — PROGRESS NOTES
Physical Therapy  Attempted to see pt this PM but pt very lethargic and continues to fall asleep during conversation. Will re-attempt as pt more appropriate.

## 2021-04-29 NOTE — TELEPHONE ENCOUNTER
Jose Antonio 45 Transitions Initial Follow Up Call    Outreach made within 2 business days of discharge: Yes    Patient: Marquise Lozoya Patient : 1955   MRN: [id number]  Reason for Admission: There are no discharge diagnoses documented for the most recent discharge. Discharge Date: 20       Spoke with: [facility]    Discharge department/facility: [facility]    TCM Interactive Patient Contact:  Was patient able to fill all prescriptions: Yes  Was patient instructed to bring all medications to the follow-up visit: Yes  Is patient taking all medications as directed in the discharge summary?  Yes  Does patient understand their discharge instructions: Yes  Does patient have questions or concerns that need addressed prior to 7-14 day follow up office visit: no    Scheduled appointment with PCP within 7-14 days    Follow Up  Future Appointments   Date Time Provider Natalya Scales   2021  1:00 PM CAROLANN Ardon  [place] [facility]   2021  9:30 AM CAROLANN Ardon  [facility]   2021  1:00 PM [facility] NURSE [facility] MED ONC [place]   2021  1:15 PM Sheldon Llanos MD [facility] [address] [place] [address]

## 2021-04-29 NOTE — CONSULTS
with any questions or concerns. Ramiro Bob MD, [address], [facility]  Board Certified Neurosurgeon  Minimally Invasive Spine Surgeon  President of [address] [place] [address]..    Phone: Candi Huong ([phone])  Fax: [phone]  Website: [employer]chal Iqbal. [employer]

## 2021-04-29 NOTE — PLAN OF CARE
Problem: Pain:  Goal: Pain level will decrease  Description: Pain level will decrease  Outcome: Ongoing  Goal: Control of acute pain  Description: Control of acute pain  Outcome: Ongoing  Goal: Control of chronic pain  Description: Control of chronic pain  Outcome: Ongoing     Problem: Mobility - Impaired:  Goal: Mobility will improve  Description: Mobility will improve  Outcome: Ongoing     Problem: Physical Regulation:  Goal: Will remain free from infection  Description: Will remain free from infection  Outcome: Ongoing

## 2021-04-29 NOTE — DISCHARGE SUMMARY
Discharge Summary    Name:  Cecile Reddy /Age/Sex: 1955  (72 y.o. male)   MRN & CSN:  [id number] & [id number] Admission Date/Time: 2021 11:17 AM   Attending:  Paz Victoria MD Discharging Physician: Jermain Cuellar MD     HPI and Hospital Course:   Cecile Reddy is a 72 y.o.  male  who presented with back pain and abdominal pain    HPI- as per DEVI and Calderon Valdez- in the setting of RA-with constitutional symptoms reported on presentation- no significantly elevation of ESR and CRP- on plaquenil, added short course of indocin. Also started steroids but made him more agitated hence stopped. 2-Chronic back pain with chronic pain syndrome- reports used to follow up with pain management- but lost follow up and now having difficulty to get one. CTs of spines noted- with L5-S1 moderate to severe disc disease, says cannot get MRI due to metal in his neck- seen by NS here with no intervention recommended, to follow up with Chayo Haro on 21- ordered short course of oxycodone prn till follow up.  3-?Colitis per CT - reported diarrhea on admission and now says he is constipated- no overwhelming evidence of infection, stopped abx, had bowel movement yesterday after miralax. Discussed can take prn laxatives OTC  4-Psych disorder- has had several inpatient psych treatment- had an episode of uncontrolled behavior yesterday, likely due to steroids which I stopped, today in better mood and cooperative, denies any self harm or others harm ideations. Declines psych consult. 5-Weakness- declines rehab- requests home health care as he lives by himself. Tried to call listed family member yesterday and today- no one answered.     Other issues  -HTN  -DM  -COPD  -Depression  -Thrombocytopenia      The patient expressed appropriate understanding of and agreement with the discharge recommendations, medications, and plan.      Consults this admission:  IP CONSULT TO CASE MANAGEMENT  IP CONSULT TO HOSPITALIST  IP CONSULT TO SOCIAL WORK  IP CONSULT TO ENDOCRINOLOGY  IP CONSULT TO IV TEAM  IP CONSULT TO PSYCHIATRY  IP CONSULT TO NEUROSURGERY  IP CONSULT TO [address] [place] [address] NEEDS    Discharge Instruction:   Follow up appointments:   Primary care physician:  within 2 weeks    Diet:  General/cardiac/ADA/as tolerated  Activity: {discharge activity: as tolerated  Disposition: Discharged to:   [x]Home, [x]C, []SNF, []Acute Rehab, []Hospice   Condition on discharge: Stable    Discharge Medications:      Sarita Koenig Medication Instructions [facility]    Printed on:21 6513   Medication Information                      albuterol sulfate HFA (PROAIR HFA) 108 (90 Base) MCG/ACT inhaler  Inhale 2 puffs into the lungs every 6 hours as needed for Wheezing             ARIPiprazole (ABILIFY) 20 MG tablet  Take 1 tablet by mouth daily             atorvastatin (LIPITOR) 10 MG tablet  Take 1 tablet by mouth daily             baclofen (LIORESAL) 20 MG tablet  Take 1 tablet by mouth 3 times daily for 10 days             Blood Pressure KIT  1 kit by Does not apply route daily             Ferrous Sulfate (IRON) 325 (65 Fe) MG TABS  TAKE ONE (1) TABLET BY MOUTH DAILY             hydroxychloroquine (PLAQUENIL) 200 MG tablet               insulin aspart (NOVOLOG) 100 UNIT/ML injection vial  Inject 30 Units into the skin 3 times daily (before meals)             insulin glargine (LANTUS) 100 UNIT/ML injection vial  Inject 65 Units into the skin nightly             INSULIN SYRINGE 1CC/29G ([employer] INS SYRINGE 1CC/29G) 29G X 1/2\" 1 ML MISC  1 each by Does not apply route daily             lisinopril (PRINIVIL;ZESTRIL) 5 MG tablet  Take 1 tablet by mouth daily             Melatonin 10 MG TABS  Take 1 tablet by mouth nightly             Multiple Vitamin (DAILY WILLEM) TABS  Take 1 tablet by mouth daily Indications: supplement             NARCAN 4 MG/0.1ML LIQD nasal spray  1 spray by Nasal route as needed for Opioid Reversal             oxyCODONE HCl (OXY-IR) 10 MG immediate release tablet  Take 1 tablet by mouth every 6 hours as needed for Pain for up to 5 days. Polyvinyl Alcohol-Povidone (REFRESH OP)  Apply to eye             prazosin (MINIPRESS) 5 MG capsule  Take 2 capsules by mouth nightly             pregabalin (LYRICA) 100 MG capsule  Take 100 mg by mouth 3 times daily. traZODone (DESYREL) 100 MG tablet  Take 1 tablet by mouth nightly                 Objective Findings at Discharge:   /69   Pulse 94   Temp 98.7 °F (37.1 °C) (Oral)   Resp 16   Ht 5' 6\" (1.676 m)   Wt 190 lb (86.2 kg)   SpO2 91%   BMI 30.67 kg/m²            PHYSICAL EXAM   GEN Awake male, laying in bed in no apparent distress. Appears given age. EYES Pupils are equally round. No scleral discharge  HENT Atraumatic and symmetric head  NECK No apparent thyromegaly  RESP Symmetric chest movement while on room air. CARDIO/VASC Peripheral pulses equal bilaterally and palpable. No peripheral edema. GI Abdomen is not distended. Rectal exam deferred.  Hickey catheter is not present. HEME/LYMPH No petechiae or ecchymoses. MSK Spontaneous movement of BL upper extremities  SKIN Normal coloration, warm, dry. NEURO Cranial nerves appear grossly intact  PSYCH Awake, alert.     BMP/CBC  Recent Labs     04/26/21  1200 04/27/21  0611 04/28/21  0420   * 133* 131*   K 4.1 4.4 4.4    99 97*   CO2 26 28 24   BUN 12 10 20   CREATININE 0.5* 0.6* 0.6*   WBC 7.3 8.3 10.7*   HCT 43.5 46.2 44.9   PLT 92* 94* 103*     SIGNIFICANT IMAGING AND LABS:      Discharge Time of 32 minutes    Electronically signed by Baldev Jaeger MD on 4/29/2021 at 10:28 AM

## 2021-04-30 NOTE — CARE COORDINATION
[facility] Transitions Initial Follow Up Call    Call within 2 business days of discharge: Yes    Patient: Kimberlyn Russo Patient : 1955   MRN: <[id number]>  Reason for Admission: colitis  Discharge Date: 21 RARS: Readmission Risk Score: 12      Last Discharge [facility]       Complaint Diagnosis Description Type Department Provider    21 Fatigue; Pharyngitis; Abdominal Pain Colitis . .. ED to Hosp-Admission (Discharged) (ADMITTED) Gaynell Hamman, MD; Magdaleno Swartz. .. Spoke with: Patient who was very hesitant to answer my questions. States he continues to have diarrhea, abdominal pain and back pain and then yelled very loudly \"the doctors in the did nothing for me in that place! \"  I told him I understood his frustration and he hung up the phone.            Non-face-to-face services provided:  Obtained and reviewed discharge summary and/or continuity of care documents    Care Transitions 24 Hour Call    Do you have any ongoing symptoms?: Yes  Patient-reported symptoms: Abdominal Pain, Pain (Comment: back pain)  Do you have a copy of your discharge instructions?: Yes  Do you have support at home?: Alone  Care Transitions Interventions         Follow Up  Future Appointments   Date Time Provider Natalya Scales   2021  1:00 PM CAROLANN Downs  [facility]   2021  9:30 AM CAROLANN Downs NP [address] [place] [address]   2021  1:00 PM [facility], [address]   2021  1:15 PM Pascale Leija MD [facility]  [address], RN

## 2021-05-09 NOTE — PROGRESS NOTES
Hepatic cirrhosis due to chronic hepatitis C infection (HCC)     Severe single current episode of major depressive disorder, with psychotic features ([facility] [address]     Hypertension     Chronic back pain     Type 2 diabetes mellitus, with long-term current use of insulin (HCC)     Rash and nonspecific skin eruption     Decreased white blood cell count     Other specified anemias     Nonspecific elevation of levels of transaminase and lactic acid dehydrogenase (LDH)     Other secondary thrombocytopenia     Schizophrenia (HCC)     Colitis      Plan:     1. Reviewed POC blood glucose . Labs and X ray results   2. Reviewed Current Medicines   3. On meal/ Correction bolus Humalog/ Basal Lantus Insulin regime /  4. Monitor Blood glucose frequently   5. Modified  the dose of Insulin/ other medicines as needed  6. Do consult from neurosurgery  7. Will follow     .      Casimiro Villegas MD

## 2021-05-09 NOTE — PROGRESS NOTES
Pr  Subjective:   Admit Date: 4/26/2021  PCP: CAROLANN Cook - NP    Admitted For :Back pain and abdominal pain over the body he says    Consulted For: Better control of blood glucose       Interval History: Still complains of back pain    Denies any chest pains,   Denies SOB . Denies nausea or vomiting. No new bowel or bladder symptoms. No intake or output data in the 24 hours ending 05/09/21 1846    DATA    CBC: No results for input(s): WBC, HGB, PLT in the last 72 hours. CMP:No results for input(s): NA, K, CL, CO2, BUN, CREATININE, CALCIUM, PROT, LABALBU, BILITOT, ALKPHOS, AST, ALT in the last 72 hours. Invalid input(s): GLU  Lipids:   Lab Results   Component Value Date    CHOL 95 03/26/2020    HDL 40 03/26/2020    TRIG 87 03/26/2020     Glucose:No results for input(s): POCGLU in the last 72 hours. [id number]:  Lab Results   Component Value Date    LABA1C 8.2 04/28/2021     High Sensitivity TSH:   Lab Results   Component Value Date    TSHHS 1.430 04/26/2021     Free T3: No results found for: FT3  Free T4:No results found for: T4FREE    No results found. Scheduled Medicines   Medications:    Infusions:       Objective:   Vitals: /69   Pulse 94   Temp 98.7 °F (37.1 °C) (Oral)   Resp 16   Ht 5' 6\" (1.676 m)   Wt 190 lb (86.2 kg)   SpO2 91%   BMI 30.67 kg/m²   General appearance: alert and cooperative with exam  Neck: no JVD or bruit  Thyroid : Normal lobes   Lungs: Has Vesicular Breath sounds   Heart:  regular rate and rhythm  Abdomen: soft, non-tender; bowel sounds normal; no masses,  no organomegaly  Musculoskeletal: No back tenderness extremities: extremities normal, , no edema  Neurologic:  Awake, alert, oriented to name, place and time. Cranial nerves II-XII are grossly intact. Motor is  intact. Sensory radiculopathy. ,  and gait is abnormal.  Unstable    Assessment:     Patient Active Problem List:     Bipolar disorder ([facility] [address]     Simple chronic bronchitis ([facility] [address] Hepatic cirrhosis due to chronic hepatitis C infection (HCC)     Severe single current episode of major depressive disorder, with psychotic features ([facility] [address]     Hypertension     Chronic back pain     Type 2 diabetes mellitus, with long-term current use of insulin (HCC)     Rash and nonspecific skin eruption     Decreased white blood cell count     Other specified anemias     Nonspecific elevation of levels of transaminase and lactic acid dehydrogenase (LDH)     Other secondary thrombocytopenia     Schizophrenia (HCC)     Colitis      Plan:     1. Reviewed POC blood glucose . Labs and X ray results   2. Reviewed Current Medicines   3. On meal/ Correction bolus Humalog/ Basal Lantus Insulin regime /  4. Monitor Blood glucose frequently   5. Modified  the dose of Insulin/ other medicines as needed  6. 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 Will follow     .      Pia Donovan MD

## 2021-06-14 NOTE — ED PROVIDER NOTES
Emergency Department Encounter    Patient: Gema Lanier  MRN: [id number]  : 1955  Date of Evaluation: 2021  ED Provider:  Jeanie Augustine MD      Triage Chief Complaint:   Groin Swelling (R)      [place]:  Gema Lanier is a 72 y.o. male that presents to the emergency department with swelling and pain in the right groin. Patient first noticed a small \"lump\" in the \"right inguinal region\" on Thursday. This was about 4 days prior to presentation. Pain has become a \"3 or 4 times bigger\" and is very sore to touch. The area began to drain and bleed earlier this morning. Patient reports more generalized abdominal tenderness. This is aching in quality and constant in timing. Pain does not radiate or migrate. He denies any current nausea or vomiting. He does report a ventral hernia, but this is remote from the swollen, bleeding area of the groin. He denies any obvious sick contact or spoiled food exposure. He has had some loose stools but denies any blood or mucus. Patient reports no other particular provocative or alleviating factors. ROS - see HPI, below listed is current ROS at time of my eval:  CONSTITUTIONAL: No fevers, chills, or sweats. EYES: No vision change, redness, drainage, or discharge. HENT: No sore throat, runny nose, or earache. No dental pain. No painful swallowing. RESPIRATORY: No shortness of breath, cough, or sputum production. CARDIOVASCULAR: No anginal-type chest pain, orthopnea, or edema. GASTROINTESTINAL: As above. GENITOURINARY: No frequency, urgency, or dysuria. No hematuria. MUSCULOSKELETAL: No recent injury. No neck, back, or extremity pain. NEUROLOGICAL: No focal weakness, numbness, or tingling. SKIN: As above.     Medical history:  Past Medical History:   Diagnosis Date    Arthritis     [facility]ipolar 1 disorder [facility]     Chronic airway obstruction, not elsewhere classified 10/28/2013    Chronic major depressive disorder, recurrent episode ([facility]     Diabetes mellitus ([address]     GERD (gastroesophageal reflux disease)     Hepatitis C     Hypertension     Low back pain     Post traumatic stress disorder     Schizo affective schizophrenia [facility])     per old chart pt in ER 7/28/2017- aggressive behavior at [facility]- and in ER- transferred to [address]) paranoid     No past surgical history on file. Family History   Problem Relation Age of Onset    Diabetes Mother     Cancer Mother     Diabetes Father     Cancer Father     Cancer Brother      Social History     Socioeconomic History    Marital status:      Spouse name: Not on file    Number of children: Not on file    Years of education: Not on file    Highest education level: Not on file   Occupational History    Not on file   Tobacco Use    Smoking status: Current Every Day Smoker     Packs/day: 1.00     Types: Cigarettes    Smokeless tobacco: Never Used   Vaping Use    Vaping Use: Never used   Substance and Sexual Activity    Alcohol use: No    Drug use: No    Sexual activity: Not Currently   Other Topics Concern    Not on file   Social History Narrative    Not on file     Social Determinants of Health     Financial Resource Strain:     Difficulty of Paying Living Expenses:    Food Insecurity:     Worried About Running Out of Food in the Last Year:     [address] in the Last Year:    Transportation Needs:     Lack of Transportation (Medical):      Lack of Transportation (Non-Medical):    Physical Activity:     Days of Exercise per Week:     Minutes of Exercise per Session:    Stress:     Feeling of Stress :    Social Connections:     Frequency of Communication with Friends and Family:     Frequency of Social Gatherings with Friends and Family:     Attends [employer] Services:     Active Member of Clubs or Organizations:     Attends Club or Organization Meetings:     Marital Status:    Intimate Partner Violence:     Fear of Current or Ex-Partner:     Emotionally Abused:     Physically Abused:     Sexually Abused:      Current Facility-Administered Medications   Medication Dose Route Frequency Provider Last Rate Last Admin    iopamidol (ISOVUE-370) 76 % injection 80 mL  80 mL Intravenous ONCE PRN Katina Del Toro MD         Current Outpatient Medications   Medication Sig Dispense Refill    methotrexate (RHEUMATREX) 2.5 MG chemo tablet Take 12.5 mg by mouth once a week Takes on Mondays      gabapentin (NEURONTIN) 100 MG capsule Take 100 mg by mouth 3 times daily.  baclofen (LIORESAL) 10 MG tablet Take 10 mg by mouth 2 times daily      predniSONE (DELTASONE) 5 MG tablet Take 5 mg by mouth daily      oxyCODONE HCl (OXY-IR) 10 MG immediate release tablet Take 10 mg by mouth 4 times daily as needed.  clindamycin (CLEOCIN) 300 MG capsule Take 1 capsule by mouth 3 times daily for 10 days 30 capsule 0    traMADol (ULTRAM) 50 MG tablet Take 1 tablet by mouth every 4 hours as needed for Pain for up to 3 days. Intended supply: 3 days. Take lowest dose possible to manage pain 18 tablet 0    ondansetron (ZOFRAN-ODT) 4 MG disintegrating tablet Take 1 tablet by mouth 3 times daily as needed for Nausea or Vomiting 21 tablet 0    naloxone 4 MG/0.1ML LIQD nasal spray 1 spray by Nasal route as needed for Opioid Reversal 1 each 5    Ferrous Sulfate (IRON) 325 (65 Fe) MG TABS TAKE ONE (1) TABLET BY MOUTH DAILY 90 tablet 1    traZODone (DESYREL) 100 MG tablet Take 1 tablet by mouth nightly 30 tablet 0    insulin glargine (LANTUS) 100 UNIT/ML injection vial Inject 65 Units into the skin nightly (Patient taking differently: Inject 80 Units into the skin 2 times daily ) 5 vial 5    insulin aspart (NOVOLOG) 100 UNIT/ML injection vial Inject 30 Units into the skin 3 times daily (before meals) 3 vial 5    pregabalin (LYRICA) 75 MG capsule Take 75 mg by mouth 2 times daily.        lisinopril (PRINIVIL;ZESTRIL) 20 MG tablet Take 20 mg by mouth daily      NARCAN 4 MG/0.1ML LIQD nasal spray 1 spray by Nasal route as needed for Opioid Reversal 3 each 0    indomethacin (INDOCIN) 50 MG capsule Take 1 capsule by mouth 2 times daily (with meals) for 7 days 14 capsule 0    prazosin (MINIPRESS) 5 MG capsule Take 2 capsules by mouth nightly 180 capsule 0    hydroxychloroquine (PLAQUENIL) 200 MG tablet       ARIPiprazole (ABILIFY) 20 MG tablet Take 1 tablet by mouth daily 90 tablet 0    atorvastatin (LIPITOR) 10 MG tablet Take 1 tablet by mouth daily 90 tablet 0    INSULIN SYRINGE 1CC/29G ([employer] INS SYRINGE 1CC/29G) 29G X 1/2\" 1 ML MISC 1 each by Does not apply route daily 200 each 5    Melatonin 10 MG TABS Take 1 tablet by mouth nightly 90 tablet 0    Multiple Vitamin (DAILY WILLEM) TABS Take 1 tablet by mouth daily Indications: supplement 90 tablet 0    Blood Pressure KIT 1 kit by Does not apply route daily 1 kit 0    albuterol sulfate HFA (PROAIR HFA) 108 (90 Base) MCG/ACT inhaler Inhale 2 puffs into the lungs every 6 hours as needed for Wheezing      Polyvinyl Alcohol-Povidone (REFRESH OP) Apply to eye       Allergies   Allergen Reactions    Latex     Acetaminophen      endstage liver disease    Bee Venom     Haldol [Haloperidol Lactate]     Nsaids      End stage liver disease    Nuts [Peanut-Containing Drug Products]     Other Swelling     insects       Nursing Notes Reviewed    Physical Exam:  Triage VS:    ED Triage Vitals [06/14/21 0830]   Enc Vitals Group      BP (!) 144/72      Pulse 112      Resp 18      Temp 99 °F (37.2 °C)      Temp Source Oral      SpO2 94 %      Weight 190 lb (86.2 kg)      Height 5' 6\" (1.676 m)      Head Circumference       Peak Flow       Pain Score       Pain Loc       Pain Edu? Excl. in [address]? My pulse ox interpretation is -normal on room air    GENERAL: Patient is awake, alert, and oriented appropriately. Patient is resting comfortably in a still position on the exam table. Patient speaking in full and complete sentences. Well-nourished and well-developed. HEENT: Normocephalic and atraumatic. Pupils equal, round, and reactive to light. No redness or matting. Bilateral external ears are unremarkable. Nasal mucosa is pink without purulence. Oral mucosa is moist and pink. NECK: Supple with normal range of motion. No Kernig's or Brudzinski sign. No significant lymphadenopathy. Short neck with no visible JVD. RESPIRATORY: Symmetric aeration. No respiratory distress. No wheeze, stridor, rales, rhonchi. Chest wall stable and nontender. CARDIOVASCULAR: Regular rate and rhythm. No murmurs, rubs, or gallops. No central or peripheral cyanosis. GASTROINTESTINAL: Protuberant abdomen with diffuse tenderness. Likely periumbilical hernia noted with no McBurney's or Rico's point tenderness. No other focal tenderness. No involuntary guarding, rebound, or rigidity. No mass or pulsatile mass. Bowel sounds normal.    GENITOURINARY: Genitourinary inspection indicates no penile drainage or tenderness. Right inguinal crease exhibits a punctate wound with wound draining small amount of purulence with a white plug noted. Maroon-colored bleeding and surrounding ecchymosis noted. No palpable fluctuance or mass otherwise. NEUROLOGICAL: Awake, alert and oriented x 3. Cranial nerves III through XII are grossly intact as tested without facial droop or dermatomal paresthesias. Of note, forehead wrinkles are symmetric and intact. Conjugate gaze without entrapment. No asymmetry of the corners of the mouth or nasolabial folds. No gross motor or cerebellar deficits. Motor exam shows 5/5 strength in the bilateral trapezius, biceps, triceps, handgrip, quadriceps, psoas, dorsiflexors, and plantar flexors. No dermatomal paresthesias across the extremities. No ataxia. MUSCULOSKELETAL: No asymmetric edema, Homans' sign, or cords.   No tenderness or limitation range of motion to the bilateral shoulders, elbows, wrists, hips, Patient is satisfied with evaluation and agreeable to recommendations. Patient has had the opportunity to ask questions, and they have been answered to the best of my ability. Instructions are given to follow-up with primary care provider for reevaluation and further testing. Very strict return and follow-up instructions are provided. Patient seen during [facility], I did don appropriate PPE during my encounters with the patient, including n95 (when appropriate) mask and eye protection as appropriate.     I have reviewed and interpreted all of the currently available lab results from this visit (if applicable):  Results for orders placed or performed during the hospital encounter of 06/14/21   CBC Auto Differential   Result Value Ref Range    WBC 7.0 4.0 - 10.5 K/CU MM    RBC 4.54 (L) 4.6 - 6.2 M/CU MM    Hemoglobin 13.9 13.5 - 18.0 GM/DL    Hematocrit 40.6 (L) 42 - 52 %    MCV 89.4 78 - 100 FL    MCH 30.6 27 - 31 PG    MCHC 34.2 32.0 - 36.0 %    RDW 13.7 11.7 - 14.9 %    Platelets 62 (L) 789 - 440 K/CU MM    MPV 9.0 7.5 - 11.1 FL    Differential Type AUTOMATED DIFFERENTIAL     Segs Relative 79.0 (H) 36 - 66 %    Lymphocytes % 13.1 (L) 24 - 44 %    Monocytes % 6.3 (H) 0 - 4 %    Eosinophils % 0.6 0 - 3 %    Basophils % 0.3 0 - 1 %    Segs Absolute 5.6 K/CU MM    Lymphocytes Absolute 0.9 K/CU MM    Monocytes Absolute 0.4 K/CU MM    Eosinophils Absolute 0.0 K/CU MM    Basophils Absolute 0.0 K/CU MM    Nucleated RBC % 0.0 %    Total Nucleated RBC 0.0 K/CU MM    Total Immature Neutrophil 0.05 K/CU MM    Immature Neutrophil % 0.7 (H) 0 - 0.43 %   Basic Metabolic Panel w/ Reflex to MG   Result Value Ref Range    Sodium 128 (L) 135 - 145 MMOL/L    Potassium 4.1 3.5 - 5.1 MMOL/L    Chloride 91 (L) 99 - 110 mMol/L    CO2 24 21 - 32 MMOL/L    Anion Gap 13 4 - 16    BUN 13 6 - 23 MG/DL    CREATININE 0.5 (L) 0.9 - 1.3 MG/DL    Glucose 253 (H) 70 - 99 MG/DL    Calcium 8.9 8.3 - 10.6 MG/DL    GFR Non- >60 >60 mL/min/1.73m2    GFR African American >60 >60 mL/min/1.73m2   Hepatic Function Panel   Result Value Ref Range    Albumin 4.3 3.4 - 5.0 GM/DL    Total Bilirubin 0.9 0.0 - 1.0 MG/DL    Bilirubin, Direct 0.3 0.0 - 0.3 MG/DL    Bilirubin, Indirect 0.6 0 - 0.7 MG/DL    Alkaline Phosphatase 51 40 - 129 IU/L    AST 24 15 - 37 IU/L    ALT 26 10 - 40 U/L    Total Protein 6.8 6.4 - 8.2 GM/DL   Urinalysis, reflex to microscopic   Result Value Ref Range    Color, UA YELLOW YELLOW    Clarity, UA CLEAR CLEAR    Glucose, Urine 50 (A) NEGATIVE MG/DL    Bilirubin Urine NEGATIVE NEGATIVE MG/DL    Ketones, Urine NEGATIVE NEGATIVE MG/DL    Specific Gravity, UA 1.016 1.001 - 1.035    Blood, Urine NEGATIVE NEGATIVE    pH, Urine 6.0 5.0 - 8.0    Protein, UA NEGATIVE NEGATIVE MG/DL    Urobilinogen, Urine 2.0 (H) 0.2 - 1.0 MG/DL    Nitrite Urine, Quantitative NEGATIVE NEGATIVE    Leukocyte Esterase, Urine NEGATIVE NEGATIVE    RBC, UA 1 0 - 3 /HPF    WBC, UA <1 0 - 2 /HPF    Bacteria, UA NEGATIVE NEGATIVE /HPF    Mucus, UA RARE (A) NEGATIVE HPF    Trichomonas, UA NONE SEEN NONE SEEN /HPF   Lipase   Result Value Ref Range    Lipase 24 13 - 60 IU/L   Lactic Acid, Plasma   Result Value Ref Range    Lactate 1.8 0.4 - 2.0 mMOL/L        Radiographs (if obtained):  Radiologist's Report Reviewed:  CT ABDOMEN PELVIS W IV CONTRAST Additional Contrast? None    Result Date: 6/14/2021  EXAMINATION: CT OF THE ABDOMEN AND PELVIS WITH CONTRAST 6/14/2021 10:27 am TECHNIQUE: CT of the abdomen and pelvis was performed with the administration of intravenous contrast. Multiplanar reformatted images are provided for review. Dose modulation, iterative reconstruction, and/or weight based adjustment of the mA/kV was utilized to reduce the radiation dose to as low as reasonably achievable.  COMPARISON: April 26, 2021 HISTORY: ORDERING SYSTEM PROVIDED HISTORY: Generalized abdominal pain, history of colitis, spontaneously draining abscess in the right inguinal region, concern for deep extension TECHNOLOGIST PROVIDED HISTORY: Additional Contrast?->None Reason for exam:->Generalized abdominal pain, history of colitis, spontaneously draining abscess in the right inguinal region, concern for deep extension Decision Support Exception - unselect if not a suspected or confirmed emergency medical condition->Emergency Medical Condition (MA) FINDINGS: Lower Chest: Lung bases are clear. No pleural effusion. Organs: Hepatic steatosis. Gallbladder is absent. Portal vein and splenic vein opacify normally. There is recanalization of the paraumbilical vein and dilated vessels in the anterior abdominal wall. The splenic vein is mildly dilated. Spleen is enlarged, 18 cm in the craniocaudal dimension. Pancreas and adrenal glands are unremarkable. Kidneys enhance symmetrically. No hydronephrosis or perinephric stranding. GI/Bowel: Moderate volume of stool in the colon. Terminal ileum is unremarkable. No evidence of appendiceal inflammation. No evidence of bowel obstruction or free intraperitoneal air. Pelvis: The bladder wall is thickened circumferentially. No free fluid in the pelvis. Prostate is enlarged. Peritoneum/Retroperitoneum: Abdominal aorta is atherosclerotic. No retroperitoneal adenopathy. Bones/Soft Tissues: No aggressive lytic or blastic bony lesion. Moderate to severe L5-S1 degenerative disc disease. 1. No acute finding in the abdomen or pelvis to account for patient's pain. 2. Splenomegaly with secondary finding suggestive of portal venous hypertension. 3. Cholecystectomy. 4. Prostatomegaly. 5. Mild circumferential thickening of the bladder wall. Please correlate with clinical symptoms of cystitis. If patient has no clinical symptoms of cystitis, differential would include chronic bladder outlet obstruction. Additional data:  Most recent CT scan of the abdomen pelvis obtained through this hospital network April 2021 indicates:  Prior cholecystectomy. distress. There is no intractable vomiting. Procedures: None. Consultations: None. Clinical Impression:  1. Cutaneous abscess of right lower extremity    2. Generalized abdominal pain    3. Hyperglycemia    4. Hyponatremia      Disposition referral (if applicable):  Jan Belcher MD  [id number] [facility]  [phone]    Schedule an appointment as soon as possible for a visit   Or your regular primary care provider for long-term management    Jesus Martell MD  [address]  [phone]    Schedule an appointment as soon as possible for a visit   For reevaluation and possible removal of the abscess/cyst    [facility]  [facility] [id number]  [phone]  Go to   As needed, If symptoms worsen    Disposition medications (if applicable):  Discharge Medication List as of 6/14/2021  1:39 PM      START taking these medications    Details   clindamycin (CLEOCIN) 300 MG capsule Take 1 capsule by mouth 3 times daily for 10 days, Disp-30 capsule, R-0Normal      traMADol (ULTRAM) 50 MG tablet Take 1 tablet by mouth every 4 hours as needed for Pain for up to 3 days. Intended supply: 3 days. Take lowest dose possible to manage pain, Disp-18 tablet, R-0Normal      ondansetron (ZOFRAN-ODT) 4 MG disintegrating tablet Take 1 tablet by mouth 3 times daily as needed for Nausea or Vomiting, Disp-21 tablet, R-0Normal      !! naloxone 4 MG/0.1ML LIQD nasal spray 1 spray by Nasal route as needed for Opioid Reversal, Disp-1 each, R-5Normal       !! - Potential duplicate medications found. Please discuss with provider.         ED Provider Disposition Time  DISPOSITION Decision To Discharge 06/14/2021 02:00:51 PM      Comment: Please note this report has been produced using speech recognition software and may contain errors related to that system including errors in grammar, punctuation, and spelling, as well as words and phrases that may be inappropriate. Efforts were made to edit the dictations.         Toro Forbes MD  06/14/21 3436

## 2021-06-14 NOTE — ED TRIAGE NOTES
Pt arrived c/o rt groin swelling, bleeding and pain 8/10 at this time. Pt reports taking prescribed percocet prior to EMS arrival. Pt reports he first noticed the swelling Thursday. Pt states he woke up with blood on his leg today. Bleeding controlled at this time.

## 2021-06-14 NOTE — PROGRESS NOTES
Medication History  [facility]    Patient Name: Tracey Mcgarry 1955     Medication history has been completed by: Venkatesh Crawley CPhT    Source(s) of information: patient, insurance claims and retail pharmacy     Primary Care Physician: No primary care provider on file. Pharmacy: [id number] as of 06/14/2021 - Fully Reviewed 06/14/2021   Allergen Reaction Noted    Latex  07/27/2017    Acetaminophen  03/01/2014    Bee venom  07/27/2017    Haldol [haloperidol lactate]  07/27/2017    Nsaids  03/01/2014    Nuts [peanut-containing drug products]  07/27/2017    Other Swelling 11/03/2011        Prior to Admission medications    Medication Sig Start Date End Date Taking? Authorizing Provider   methotrexate (RHEUMATREX) 2.5 MG chemo tablet Take 12.5 mg by mouth once a week Takes on Mondays 5/19/21  Yes Historical Provider, MD   gabapentin (NEURONTIN) 100 MG capsule Take 100 mg by mouth 3 times daily. 5/20/21  Yes Historical Provider, MD   baclofen (LIORESAL) 10 MG tablet Take 10 mg by mouth 2 times daily 5/13/21  Yes Historical Provider, MD   predniSONE (DELTASONE) 5 MG tablet Take 5 mg by mouth daily 5/19/21  Yes Historical Provider, MD   oxyCODONE HCl (OXY-IR) 10 MG immediate release tablet Take 10 mg by mouth 4 times daily as needed.  5/20/21  Yes Historical Provider, MD   Ferrous Sulfate (IRON) 325 (65 Fe) MG TABS TAKE ONE (1) TABLET BY MOUTH DAILY 5/12/21  Yes Dwight Blanco MD   traZODone (DESYREL) 100 MG tablet Take 1 tablet by mouth nightly 4/29/21 6/14/21 Yes Archie Junior MD   insulin glargine (LANTUS) 100 UNIT/ML injection vial Inject 65 Units into the skin nightly  Patient taking differently: Inject 80 Units into the skin 2 times daily  4/29/21  Yes Archie Junior MD   insulin aspart (NOVOLOG) 100 UNIT/ML injection vial Inject 30 Units into the skin 3 times daily (before meals) 5/20/20  Yes Elliot Flor APRN - NP   pregabalin (LYRICA) 75 MG capsule Take 75 mg by dosage change from 5 mg to 20 mg     Comments:  Patient is poor historian for medications. Reached out to retail pharmacy for verification of last fill dates. Methotrexate therapy updated per information received. Patient stated he takes this on Mondays, did not take today's dose. Ordered 12.5 mg weekly. Insulin therapy updated as patient able to inform interviewer he takes lantus 80-85 units BID and Novolog 30-35 units with meals. States he has not taken his insulin for a couple of days. Patient reports he is out of these medications. .. Baclofen  Gabapentin  Pregabalin  Per insurance claims/retail pharmacy patient should be out of. .  Abilify  Atorvastatin  Prazosin  Lisinopri     To my knowledge the above medication history is accurate as of 6/14/2021 10:49 AM.   Johnny Rosa CPhT   6/14/2021 10:49 AM

## 2021-06-14 NOTE — ED NOTES
Dressing applied to wound on groin, pt tolerated well.  Case management is setting up ride for pt per request      Gordo Stephenson RN  06/14/21 3655

## 2021-06-14 NOTE — CARE COORDINATION
Nurse requested transport for patient upon discharge. CM called [employer] @ [phone] and requested [facility] transport patient to home address: [address] [place] [address].

## 2021-06-22 NOTE — PROGRESS NOTES
This on-call provider received phone call from [facility]  regarding patient.  states patient was found  in his bed by his apartment building staff this afternoon. She states police suspect no trauma or anything suspicious. This provider discussed some recent history and list of providers who have seen the patient recently. Per recent ED note, patient was sent to dr Morris Dey for primary care. There is no visit wi th dr Morris Dey noted. Per , patients sister is suspicious of suicide due to long history of mental health disorders. Patient was following with mahendra harrison CNP, psychiatry. This PCP has not seen the patient since 2020. He was dismissed from the practice in May 2021 due to recurrent no-shows and failure to follow up with office. It is noted that the patient advised PCP in 2020 that he would be serenity agrnett for another provider.

## 2021-06-23 NOTE — TELEPHONE ENCOUNTER
This on-call provider received phone call from [facility]  regarding patient.  states patient was found  in his bed by his apartment building staff this afternoon. She states police suspect no trauma or anything suspicious. This provider discussed some recent history and list of providers who have seen the patient recently. Per recent ED note, patient was sent to dr Thomas Bonner for primary care. There is no visit wi th dr Thomas Bonner noted.      Per , patients sister is suspicious of suicide due to long history of mental health disorders. Patient was following with mahendra harrison CNP, psychiatry.      This PCP has not seen the patient since 2020. He was dismissed from the practice in May 2021 due to recurrent no-shows and failure to follow up with office. It is noted that the patient advised PCP in 2020 that he would be serenity garnett for another provider.

## 2021-06-24 ENCOUNTER — TELEPHONE (OUTPATIENT)
Dept: FAMILY MEDICINE CLINIC | Age: 66
End: 2021-06-24
